# Patient Record
Sex: FEMALE | Race: WHITE | NOT HISPANIC OR LATINO | Employment: UNEMPLOYED | URBAN - METROPOLITAN AREA
[De-identification: names, ages, dates, MRNs, and addresses within clinical notes are randomized per-mention and may not be internally consistent; named-entity substitution may affect disease eponyms.]

---

## 2018-10-10 ENCOUNTER — PROCEDURE VISIT (OUTPATIENT)
Dept: OBSTETRICS AND GYNECOLOGY | Facility: CLINIC | Age: 23
End: 2018-10-10

## 2018-10-10 ENCOUNTER — OFFICE VISIT (OUTPATIENT)
Dept: OBSTETRICS AND GYNECOLOGY | Facility: CLINIC | Age: 23
End: 2018-10-10

## 2018-10-10 VITALS
HEIGHT: 63 IN | WEIGHT: 143 LBS | BODY MASS INDEX: 25.34 KG/M2 | SYSTOLIC BLOOD PRESSURE: 116 MMHG | DIASTOLIC BLOOD PRESSURE: 64 MMHG

## 2018-10-10 DIAGNOSIS — O03.9 COMPLETE ABORTION: Primary | ICD-10-CM

## 2018-10-10 DIAGNOSIS — O20.0 THREATENED MISCARRIAGE: Primary | ICD-10-CM

## 2018-10-10 LAB
BILIRUB BLD-MCNC: NEGATIVE MG/DL
CLARITY, POC: CLEAR
COLOR UR: YELLOW
GLUCOSE UR STRIP-MCNC: NEGATIVE MG/DL
HCG INTACT+B SERPL-ACNC: 16.34 MIU/ML
KETONES UR QL: NEGATIVE
LEUKOCYTE EST, POC: NEGATIVE
NITRITE UR-MCNC: NEGATIVE MG/ML
PH UR: 7 [PH] (ref 5–8)
PROT UR STRIP-MCNC: NEGATIVE MG/DL
RBC # UR STRIP: ABNORMAL /UL
SP GR UR: 1.02 (ref 1–1.03)
UROBILINOGEN UR QL: NORMAL

## 2018-10-10 PROCEDURE — 76817 TRANSVAGINAL US OBSTETRIC: CPT | Performed by: OBSTETRICS & GYNECOLOGY

## 2018-10-10 PROCEDURE — 99204 OFFICE O/P NEW MOD 45 MIN: CPT | Performed by: OBSTETRICS & GYNECOLOGY

## 2018-10-10 RX ORDER — PNV NO.95/FERROUS FUM/FOLIC AC 28MG-0.8MG
TABLET ORAL DAILY
Refills: 0 | COMMUNITY
Start: 2018-10-02 | End: 2021-08-09

## 2018-10-10 NOTE — PROGRESS NOTES
GERARDO Montoya  is a 23 y.o. female who presents for follow-up of a miscarriage.  She reports that her last menstrual period was 2 months ago.  She took a home pregnancy test last week and it was positive.  The next day, she began to experience significant bleeding with the passage of clots and cramps.  This lasted approximately 10 hours.  The patient presented to [facility] emergency room where a workup was performed by the emergency room physician.  I have reviewed this and discussed it with the patient.  It shows a normal hemoglobin.  Blood type is A+.  Quantitative hCG was 77.  Since that time, the bleeding has gradually  Resolved.  The patient does not have any more abdominal or pelvic pain.  No more nausea or breast tenderness.  She presents today for further evaluation.    Chief Complaint   Patient presents with   • Initial Prenatal Visit     Patient is here for a new ob but miscarried.       History reviewed. No pertinent past medical history.    History reviewed. No pertinent surgical history.    Social History     Social History   • Marital status: Single     Spouse name: N/A   • Number of children: N/A   • Years of education: N/A     Occupational History   • Not on file.     Social History Main Topics   • Smoking status: Current Every Day Smoker   • Smokeless tobacco: Never Used   • Alcohol use No   • Drug use: No   • Sexual activity: Yes     Partners: Male     Other Topics Concern   • Not on file     Social History Narrative   • No narrative on file       The following portions of the patient's history were reviewed and updated as appropriate: allergies, current medications, past family history, past medical history, past social history, past surgical history and problem list.    Review of Systems is positive for vaginal bleeding which has now resolved.  It is negative for nausea or breast tenderness.  It is negative for abdominal pain.  It is negative for fever or chills.  All other systems  are reviewed and are negative.          Physical Exam   Constitutional: She is oriented to person, place, and time. She appears well-developed and well-nourished.   Abdominal: Soft. She exhibits no distension and no mass. There is no tenderness. There is no guarding.   Genitourinary:   Genitourinary Comments: Normal female external genitalia  The vagina is estrogenized.  There is no blood in the vaginal vault  Cervix is closed  The uterus is normal size.  It is mobile within the pelvis and it is nontender  No adnexal masses or tenderness are palpable   Neurological: She is alert and oriented to person, place, and time.   Skin: Skin is warm and dry.   Nursing note and vitals reviewed.      Assessment    Paris was seen today for initial prenatal visit.    Diagnoses and all orders for this visit:    Complete   -     HCG, B-subunit, Quantitative        Plan  1. Most likely a completed miscarriage.  Counseled regarding circumstances surrounding this.  Questions answered.  Also, we reviewed the hospital visit from [facility].  30 minutes of a 45 minute visit were spent in direct face-to-face counseling on this issue.      2.  quantitative hCG was 77.  We will repeat this today.  Ultrasound today shows a smooth endometrial stripe with no adnexal masses.  We discussed the significance of this.  We will follow quantitative hCGs down to 0.  After this, the patient is uncertain if she would like to become pregnant sooner if she would like to prevent this.  We will discuss this further at the workup proceeds.

## 2018-10-11 ENCOUNTER — TELEPHONE (OUTPATIENT)
Dept: OBSTETRICS AND GYNECOLOGY | Facility: CLINIC | Age: 23
End: 2018-10-11

## 2018-10-11 DIAGNOSIS — O03.9 MISCARRIAGE: Primary | ICD-10-CM

## 2018-10-11 NOTE — TELEPHONE ENCOUNTER
----- Message from Jared Anand MD sent at 10/11/2018 11:01 AM EDT -----  I could not reach the patient to review her test results.  Please contact her and let her know that her quantitative hCG has fallen from 77-16.34.  Please help her to arrange a lab visit for next week to repeat her quantitative hCG.  Thank you.

## 2018-10-12 LAB
BACTERIA UR CULT: NO GROWTH
BACTERIA UR CULT: NORMAL

## 2018-10-16 ENCOUNTER — RESULTS ENCOUNTER (OUTPATIENT)
Dept: OBSTETRICS AND GYNECOLOGY | Facility: CLINIC | Age: 23
End: 2018-10-16

## 2018-10-16 DIAGNOSIS — O03.9 MISCARRIAGE: ICD-10-CM

## 2019-07-26 ENCOUNTER — TELEPHONE (OUTPATIENT)
Dept: OBSTETRICS AND GYNECOLOGY | Age: 24
End: 2019-07-26

## 2019-07-26 NOTE — TELEPHONE ENCOUNTER
*Pt Aware Dr Hannon is out*     New Pt Dr Hannon LMP? referred by a friend to see Dr Hannon. Pt 2nd preg, hx of miscarriages. Pt delivered full term, no complications with 1st child. Dr Jared Brennan was the prior ObGyn doctor. Pt INS: [employer]. Please Advise

## 2019-08-07 ENCOUNTER — INITIAL PRENATAL (OUTPATIENT)
Dept: OBSTETRICS AND GYNECOLOGY | Age: 24
End: 2019-08-07

## 2019-08-07 ENCOUNTER — PROCEDURE VISIT (OUTPATIENT)
Dept: OBSTETRICS AND GYNECOLOGY | Age: 24
End: 2019-08-07

## 2019-08-07 VITALS — BODY MASS INDEX: 24.76 KG/M2 | SYSTOLIC BLOOD PRESSURE: 100 MMHG | WEIGHT: 139.8 LBS | DIASTOLIC BLOOD PRESSURE: 62 MMHG

## 2019-08-07 DIAGNOSIS — O99.891 ASYMPTOMATIC BACTERIURIA IN PREGNANCY: ICD-10-CM

## 2019-08-07 DIAGNOSIS — Z13.29 SCREENING FOR THYROID DISORDER: Primary | ICD-10-CM

## 2019-08-07 DIAGNOSIS — O36.80X0 ENCOUNTER TO DETERMINE FETAL VIABILITY OF PREGNANCY, SINGLE OR UNSPECIFIED FETUS: Primary | ICD-10-CM

## 2019-08-07 DIAGNOSIS — R82.71 ASYMPTOMATIC BACTERIURIA IN PREGNANCY: ICD-10-CM

## 2019-08-07 DIAGNOSIS — Z36.87 UNSURE OF LAST MENSTRUAL PERIOD AS REASON FOR ULTRASOUND SCAN: ICD-10-CM

## 2019-08-07 DIAGNOSIS — O99.330 TOBACCO USE DURING PREGNANCY, ANTEPARTUM: ICD-10-CM

## 2019-08-07 DIAGNOSIS — Z11.3 SCREENING FOR STD (SEXUALLY TRANSMITTED DISEASE): ICD-10-CM

## 2019-08-07 LAB
B-HCG UR QL: POSITIVE
C TRACH RRNA SPEC DONR QL NAA+PROBE: NEGATIVE
INTERNAL NEGATIVE CONTROL: NEGATIVE
INTERNAL POSITIVE CONTROL: POSITIVE
Lab: ABNORMAL
N GONORRHOEA DNA SPEC QL NAA+PROBE: NEGATIVE
VZV IGG SER QL: NORMAL
VZV IGG SER QL: NORMAL

## 2019-08-07 PROCEDURE — 81025 URINE PREGNANCY TEST: CPT | Performed by: OBSTETRICS & GYNECOLOGY

## 2019-08-07 PROCEDURE — 76817 TRANSVAGINAL US OBSTETRIC: CPT | Performed by: OBSTETRICS & GYNECOLOGY

## 2019-08-07 PROCEDURE — 99214 OFFICE O/P EST MOD 30 MIN: CPT | Performed by: OBSTETRICS & GYNECOLOGY

## 2019-08-07 RX ORDER — ASCORBIC ACID, CALCIUM CITRATE, IRON, VITAMIN D, DL- ALPHA- TOCOPHEROL ACETATE, THIAMINE, RIBOFLAVIN, NIACINAMIDE, PYRIDOXINE HYDROCHLORIDE, FOLIC ACID, IODINE, ZINC, COPPER, DOCUSATE SODIUM, DOCONEXENT AND ICOSAPENT
1 KIT DAILY
Qty: 60 TABLET | Refills: 12 | Status: SHIPPED | OUTPATIENT
Start: 2019-08-07 | End: 2020-08-06

## 2019-08-07 NOTE — PROGRESS NOTES
CC: New OB visit  HPI: pt presents for new OB visit. She denies any pain or bleeding. She reports she found out she was pregnant when she went to [facility] for yeast infection. She has a pregnancy test done and it was positive. She has tested about a month before this and it was negative.  ROS:  Neuro: neg for headaches  Pulm: neg for soa  GI: neg for n/v  : neg for vag bleeding  CV: neg for swelling  O: see physical form    U/s: intrauterine gest sac measuring 6 w 1 d without yolk sac or fetal pole    A/p: Pt with unsure dates. Plan f/u u/s 2 weeks to assess further. Reviewed SAB warnings as missed AB possible and pt understands. Also reviewed possibility of early viable gestation although this is less likely.     Recommended smoking cessation asap. Reviewed risks to pregnancy including SAB, abruption, PTL and SIDS    Discussed pnguidelines and reviewed information with pt and partner. All questions answered. Recommended pnlabs today

## 2019-08-08 LAB
ABO GROUP BLD: (no result)
BASOPHILS # BLD AUTO: 0.1 X10E3/UL (ref 0–0.2)
BASOPHILS NFR BLD AUTO: 1 %
BLD GP AB SCN SERPL QL: NEGATIVE
EOSINOPHIL # BLD AUTO: 0.2 X10E3/UL (ref 0–0.4)
EOSINOPHIL NFR BLD AUTO: 1 %
ERYTHROCYTE [DISTWIDTH] IN BLOOD BY AUTOMATED COUNT: 13.6 % (ref 12.3–15.4)
HBV SURFACE AG SERPL QL IA: NEGATIVE
HCT VFR BLD AUTO: 45.3 % (ref 34–46.6)
HCV AB S/CO SERPL IA: <0.1 S/CO RATIO (ref 0–0.9)
HGB BLD-MCNC: 15.3 G/DL (ref 11.1–15.9)
HIV 1+2 AB+HIV1 P24 AG SERPL QL IA: NON REACTIVE
IMM GRANULOCYTES # BLD AUTO: 0 X10E3/UL (ref 0–0.1)
IMM GRANULOCYTES NFR BLD AUTO: 0 %
LYMPHOCYTES # BLD AUTO: 2.6 X10E3/UL (ref 0.7–3.1)
LYMPHOCYTES NFR BLD AUTO: 24 %
MCH RBC QN AUTO: 30.2 PG (ref 26.6–33)
MCHC RBC AUTO-ENTMCNC: 33.8 G/DL (ref 31.5–35.7)
MCV RBC AUTO: 90 FL (ref 79–97)
MONOCYTES # BLD AUTO: 0.6 X10E3/UL (ref 0.1–0.9)
MONOCYTES NFR BLD AUTO: 6 %
NEUTROPHILS # BLD AUTO: 7.4 X10E3/UL (ref 1.4–7)
NEUTROPHILS NFR BLD AUTO: 68 %
PLATELET # BLD AUTO: 486 X10E3/UL (ref 150–450)
RBC # BLD AUTO: 5.06 X10E6/UL (ref 3.77–5.28)
RH BLD: POSITIVE
RPR SER QL: NON REACTIVE
RUBV IGG SERPL IA-ACNC: 1.64 INDEX
TSH SERPL DL<=0.005 MIU/L-ACNC: 1.93 MIU/ML (ref 0.27–4.2)
WBC # BLD AUTO: 10.9 X10E3/UL (ref 3.4–10.8)

## 2019-08-09 LAB
BACTERIA UR CULT: NORMAL
BACTERIA UR CULT: NORMAL

## 2019-08-10 LAB
C TRACH RRNA CVX QL NAA+PROBE: NEGATIVE
CONV .: NORMAL
CYTOLOGIST CVX/VAG CYTO: NORMAL
CYTOLOGY CVX/VAG DOC CYTO: NORMAL
CYTOLOGY CVX/VAG DOC THIN PREP: NORMAL
DX ICD CODE: NORMAL
HIV 1 & 2 AB SER-IMP: NORMAL
N GONORRHOEA RRNA CVX QL NAA+PROBE: NEGATIVE
OTHER STN SPEC: NORMAL
STAT OF ADQ CVX/VAG CYTO-IMP: NORMAL

## 2019-08-12 ENCOUNTER — TELEPHONE (OUTPATIENT)
Dept: OBSTETRICS AND GYNECOLOGY | Age: 24
End: 2019-08-12

## 2019-08-12 NOTE — TELEPHONE ENCOUNTER
----- Message from Perla Hannon MD sent at 8/12/2019  8:01 AM EDT -----  Please call Paris, her pap and cultures are negative/normal

## 2019-08-28 ENCOUNTER — PROCEDURE VISIT (OUTPATIENT)
Dept: OBSTETRICS AND GYNECOLOGY | Age: 24
End: 2019-08-28

## 2019-08-28 ENCOUNTER — ROUTINE PRENATAL (OUTPATIENT)
Dept: OBSTETRICS AND GYNECOLOGY | Age: 24
End: 2019-08-28

## 2019-08-28 VITALS — DIASTOLIC BLOOD PRESSURE: 60 MMHG | BODY MASS INDEX: 25.4 KG/M2 | SYSTOLIC BLOOD PRESSURE: 100 MMHG | WEIGHT: 143.4 LBS

## 2019-08-28 DIAGNOSIS — O36.80X0 ENCOUNTER TO DETERMINE FETAL VIABILITY OF PREGNANCY, SINGLE OR UNSPECIFIED FETUS: Primary | ICD-10-CM

## 2019-08-28 DIAGNOSIS — O36.5990 POOR FETAL GROWTH AFFECTING MANAGEMENT OF MOTHER, ANTEPARTUM, SINGLE OR UNSPECIFIED FETUS: ICD-10-CM

## 2019-08-28 DIAGNOSIS — Z3A.09 9 WEEKS GESTATION OF PREGNANCY: Primary | ICD-10-CM

## 2019-08-28 PROCEDURE — 76817 TRANSVAGINAL US OBSTETRIC: CPT | Performed by: OBSTETRICS & GYNECOLOGY

## 2019-08-28 PROCEDURE — 99213 OFFICE O/P EST LOW 20 MIN: CPT | Performed by: OBSTETRICS & GYNECOLOGY

## 2019-08-28 NOTE — PROGRESS NOTES
CC: pt presents for routine f/u. She denies bleeding or pelvic pain  ROS:  Neuro: neg for headaches  GI: neg for n/v  : neg for bleeding/pelvic pain  O: gen : pt in no distress  Abd: soft, nontender  Ext: no edema or cords  tv u/s: viable IUP measuring 8 weeks,   A/p: development of fetal pole with cardiac activity noted. Size is less than dates so plan u/s 2 weeks and counseled pt    Pt desires aneuploidy screening at f/u. Declines CF testing    Recommended smoking cessation

## 2019-09-17 ENCOUNTER — PROCEDURE VISIT (OUTPATIENT)
Dept: OBSTETRICS AND GYNECOLOGY | Age: 24
End: 2019-09-17

## 2019-09-17 ENCOUNTER — ROUTINE PRENATAL (OUTPATIENT)
Dept: OBSTETRICS AND GYNECOLOGY | Age: 24
End: 2019-09-17

## 2019-09-17 VITALS — WEIGHT: 145 LBS | DIASTOLIC BLOOD PRESSURE: 60 MMHG | BODY MASS INDEX: 25.69 KG/M2 | SYSTOLIC BLOOD PRESSURE: 100 MMHG

## 2019-09-17 DIAGNOSIS — O36.80X0 ENCOUNTER TO DETERMINE FETAL VIABILITY OF PREGNANCY, SINGLE OR UNSPECIFIED FETUS: Primary | ICD-10-CM

## 2019-09-17 DIAGNOSIS — Z36.0 ENCOUNTER FOR ANTENATAL SCREENING FOR CHROMOSOMAL ANOMALIES: Primary | ICD-10-CM

## 2019-09-17 DIAGNOSIS — Z3A.11 11 WEEKS GESTATION OF PREGNANCY: ICD-10-CM

## 2019-09-17 LAB
EXTERNAL CYSTIC FIBROSIS: NORMAL
EXTERNAL NIPT: NORMAL

## 2019-09-17 PROCEDURE — 76817 TRANSVAGINAL US OBSTETRIC: CPT | Performed by: OBSTETRICS & GYNECOLOGY

## 2019-09-17 PROCEDURE — 99213 OFFICE O/P EST LOW 20 MIN: CPT | Performed by: OBSTETRICS & GYNECOLOGY

## 2019-09-17 NOTE — PROGRESS NOTES
CC: ob visit and u/s  HPI: pt denies any current issues except occ ligament pain  ROS:  Const: neg for fevers  GI: neg for n/v  : neg for bleeding or pain  O: pt in no distress  U/s fetal pole 11 weeks,   fht's 168,   ext: no cords or tenderness  A/p 11 weeks: pt desires aneuploidy testing, pt declines carrier testing. Plan f/u 4 weeks

## 2019-09-21 LAB
CFDNA.FET/CFDNA.TOTAL SFR FETUS: NORMAL %
CITATION REF LAB TEST: NORMAL
FET CHR 13 TS PLAS.CFDNA QL: NEGATIVE
FET CHR 13+18+21 TS PLAS.CFDNA QL: NORMAL
FET CHR 18 TS PLAS.CFDNA QL: NEGATIVE
FET CHR 21 TS PLAS.CFDNA QL: NEGATIVE
FET Y CHROM PLAS.CFDNA QL: DETECTED
FET Y CHROM PLAS.CFDNA: NORMAL
LAB DIRECTOR NAME PROVIDER: NORMAL
LABORATORY COMMENT REPORT: NORMAL
LIMITATIONS OF THE TEST: NORMAL
NOTE: NORMAL
POSITIVE PREDICTIVE VALUE: NORMAL
REF LAB TEST METHOD: NORMAL
SERVICE CMNT 02-IMP: NORMAL
SERVICE CMNT 03-IMP: NORMAL
SERVICE CMNT-IMP: NORMAL
TEST PERFORMANCE INFO SPEC: NORMAL
TEST PERFORMANCE INFO SPEC: NORMAL

## 2019-09-23 ENCOUNTER — TELEPHONE (OUTPATIENT)
Dept: OBSTETRICS AND GYNECOLOGY | Age: 24
End: 2019-09-23

## 2019-09-23 NOTE — TELEPHONE ENCOUNTER
Called pt to review aneuploidy results. She desires gender results and notified she was having a boy.

## 2019-11-05 ENCOUNTER — ROUTINE PRENATAL (OUTPATIENT)
Dept: OBSTETRICS AND GYNECOLOGY | Age: 24
End: 2019-11-05

## 2019-11-05 ENCOUNTER — PROCEDURE VISIT (OUTPATIENT)
Dept: OBSTETRICS AND GYNECOLOGY | Age: 24
End: 2019-11-05

## 2019-11-05 VITALS — BODY MASS INDEX: 24.84 KG/M2 | WEIGHT: 140.2 LBS | SYSTOLIC BLOOD PRESSURE: 100 MMHG | DIASTOLIC BLOOD PRESSURE: 60 MMHG

## 2019-11-05 DIAGNOSIS — Z03.75 SUSPECTED CERVICAL SHORTENING NOT FOUND: Primary | ICD-10-CM

## 2019-11-05 DIAGNOSIS — Z36.1 NEED FOR MATERNAL SERUM ALPHA-PROTEIN (MSAFP) SCREENING: ICD-10-CM

## 2019-11-05 DIAGNOSIS — F32.A DEPRESSION, UNSPECIFIED DEPRESSION TYPE: ICD-10-CM

## 2019-11-05 DIAGNOSIS — Z3A.18 18 WEEKS GESTATION OF PREGNANCY: Primary | ICD-10-CM

## 2019-11-05 PROCEDURE — 99213 OFFICE O/P EST LOW 20 MIN: CPT | Performed by: OBSTETRICS & GYNECOLOGY

## 2019-11-05 PROCEDURE — 76817 TRANSVAGINAL US OBSTETRIC: CPT | Performed by: OBSTETRICS & GYNECOLOGY

## 2019-11-05 RX ORDER — TIZANIDINE 4 MG/1
TABLET ORAL
Refills: 0 | COMMUNITY
Start: 2019-10-25 | End: 2019-11-05

## 2019-11-05 RX ORDER — MELOXICAM 7.5 MG/1
7.5 TABLET ORAL DAILY
COMMUNITY
End: 2019-11-05

## 2019-11-05 RX ORDER — DICLOFENAC SODIUM AND MISOPROSTOL 75; 200 MG/1; UG/1
1 TABLET, DELAYED RELEASE ORAL 2 TIMES DAILY
COMMUNITY
End: 2019-11-05
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
## 2019-11-07 LAB
AFP ADJ MOM SERPL: 0.99
AFP INTERP SERPL-IMP: NORMAL
AFP INTERP SERPL-IMP: NORMAL
AFP SERPL-MCNC: 47.5 NG/ML
AGE AT DELIVERY: 25.8 YR
GA METHOD: NORMAL
GA: 18.4 WEEKS
IDDM PATIENT QL: NO
LABORATORY COMMENT REPORT: NORMAL
MULTIPLE PREGNANCY: NO
NEURAL TUBE DEFECT RISK FETUS: NORMAL %
RESULT: NORMAL

## 2019-11-08 ENCOUNTER — TELEPHONE (OUTPATIENT)
Dept: OBSTETRICS AND GYNECOLOGY | Age: 24
End: 2019-11-08

## 2019-11-08 NOTE — TELEPHONE ENCOUNTER
----- Message from Perla Hannon MD sent at 11/7/2019  7:56 PM EST -----  Please call Paris, her AFP is normal

## 2019-11-19 ENCOUNTER — TELEPHONE (OUTPATIENT)
Dept: OBSTETRICS AND GYNECOLOGY | Age: 24
End: 2019-11-19

## 2019-11-19 NOTE — TELEPHONE ENCOUNTER
Called pt due to missed appt. Home phone not in service. Unable to complete call to mobile number given. Left message at second home number to r/s appt asap

## 2019-12-13 ENCOUNTER — PROCEDURE VISIT (OUTPATIENT)
Dept: OBSTETRICS AND GYNECOLOGY | Age: 24
End: 2019-12-13

## 2019-12-13 ENCOUNTER — ROUTINE PRENATAL (OUTPATIENT)
Dept: OBSTETRICS AND GYNECOLOGY | Age: 24
End: 2019-12-13

## 2019-12-13 VITALS — BODY MASS INDEX: 28.17 KG/M2 | WEIGHT: 159 LBS | SYSTOLIC BLOOD PRESSURE: 100 MMHG | DIASTOLIC BLOOD PRESSURE: 60 MMHG

## 2019-12-13 DIAGNOSIS — Z3A.23 23 WEEKS GESTATION OF PREGNANCY: Primary | ICD-10-CM

## 2019-12-13 DIAGNOSIS — Z36.89 ENCOUNTER FOR FETAL ANATOMIC SURVEY: Primary | ICD-10-CM

## 2019-12-13 DIAGNOSIS — Z36.86 ENCOUNTER FOR ANTENATAL SCREENING FOR CERVICAL LENGTH: ICD-10-CM

## 2019-12-13 LAB
CLARITY, POC: CLEAR
COLOR UR: YELLOW
GLUCOSE UR STRIP-MCNC: NEGATIVE MG/DL
PROT UR STRIP-MCNC: NEGATIVE MG/DL

## 2019-12-13 PROCEDURE — 76805 OB US >/= 14 WKS SNGL FETUS: CPT | Performed by: OBSTETRICS & GYNECOLOGY

## 2019-12-13 PROCEDURE — 76817 TRANSVAGINAL US OBSTETRIC: CPT | Performed by: OBSTETRICS & GYNECOLOGY

## 2019-12-13 PROCEDURE — 99213 OFFICE O/P EST LOW 20 MIN: CPT | Performed by: OBSTETRICS & GYNECOLOGY
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
## 2019-12-20 ENCOUNTER — ROUTINE PRENATAL (OUTPATIENT)
Dept: OBSTETRICS AND GYNECOLOGY | Age: 24
End: 2019-12-20

## 2019-12-20 VITALS — DIASTOLIC BLOOD PRESSURE: 60 MMHG | BODY MASS INDEX: 28.52 KG/M2 | WEIGHT: 161 LBS | SYSTOLIC BLOOD PRESSURE: 102 MMHG

## 2019-12-20 DIAGNOSIS — Z13.0 SCREENING FOR IRON DEFICIENCY ANEMIA: ICD-10-CM

## 2019-12-20 DIAGNOSIS — Z13.1 SCREENING FOR DIABETES MELLITUS: ICD-10-CM

## 2019-12-20 DIAGNOSIS — Z3A.24 24 WEEKS GESTATION OF PREGNANCY: Primary | ICD-10-CM

## 2019-12-20 LAB
BASOPHILS # BLD AUTO: 0.05 10*3/MM3 (ref 0–0.2)
BASOPHILS NFR BLD AUTO: 0.3 % (ref 0–1.5)
CLARITY, POC: CLEAR
COLOR UR: YELLOW
EOSINOPHIL # BLD AUTO: 0.23 10*3/MM3 (ref 0–0.4)
EOSINOPHIL NFR BLD AUTO: 1.6 % (ref 0.3–6.2)
ERYTHROCYTE [DISTWIDTH] IN BLOOD BY AUTOMATED COUNT: 11.9 % (ref 12.3–15.4)
GLUCOSE 1H P 50 G GLC PO SERPL-MCNC: 164 MG/DL (ref 65–179)
GLUCOSE UR STRIP-MCNC: NEGATIVE MG/DL
HCT VFR BLD AUTO: 35.9 % (ref 34–46.6)
HGB BLD-MCNC: 12.4 G/DL (ref 12–15.9)
IMM GRANULOCYTES # BLD AUTO: 0.15 10*3/MM3 (ref 0–0.05)
IMM GRANULOCYTES NFR BLD AUTO: 1 % (ref 0–0.5)
LYMPHOCYTES # BLD AUTO: 2.58 10*3/MM3 (ref 0.7–3.1)
LYMPHOCYTES NFR BLD AUTO: 18 % (ref 19.6–45.3)
MCH RBC QN AUTO: 30.3 PG (ref 26.6–33)
MCHC RBC AUTO-ENTMCNC: 34.5 G/DL (ref 31.5–35.7)
MCV RBC AUTO: 87.8 FL (ref 79–97)
MONOCYTES # BLD AUTO: 0.74 10*3/MM3 (ref 0.1–0.9)
MONOCYTES NFR BLD AUTO: 5.1 % (ref 5–12)
NEUTROPHILS # BLD AUTO: 10.62 10*3/MM3 (ref 1.7–7)
NEUTROPHILS NFR BLD AUTO: 74 % (ref 42.7–76)
NRBC BLD AUTO-RTO: 0 /100 WBC (ref 0–0.2)
PLATELET # BLD AUTO: 484 10*3/MM3 (ref 140–450)
PROT UR STRIP-MCNC: NEGATIVE MG/DL
RBC # BLD AUTO: 4.09 10*6/MM3 (ref 3.77–5.28)
WBC # BLD AUTO: 14.37 10*3/MM3 (ref 3.4–10.8)

## 2019-12-20 PROCEDURE — 99213 OFFICE O/P EST LOW 20 MIN: CPT | Performed by: OBSTETRICS & GYNECOLOGY

## 2019-12-20 NOTE — PROGRESS NOTES
CC: OB visit and glucose  HPI: pt presents for routine visit today. She notes fetal movement. She denies vaginal bleeding or leaking fluid. She denies regular ctx.  Ros:  Pulm: neg for soa  GI: neg for abd pain or n/v  : neg for vag bleeding, leaking fluid or reg ctx  O: fh= 25 cm, FHT's 161, ext: no cords or edema  A/p: 24 weeks: pt presents for one hour today.     Reviewed PTL warnings, rtc 3 weeks    Encouraged smoking cessation. Pt notes he has cut back significantly. Reviewed risks to include abruption, PTL, SIDs and pt notes understanding

## 2019-12-23 ENCOUNTER — TELEPHONE (OUTPATIENT)
Dept: OBSTETRICS AND GYNECOLOGY | Age: 24
End: 2019-12-23

## 2019-12-23 DIAGNOSIS — R73.09 ELEVATED GLUCOSE TOLERANCE TEST: Primary | ICD-10-CM

## 2019-12-23 PROBLEM — Z3A.24 24 WEEKS GESTATION OF PREGNANCY: Status: RESOLVED | Noted: 2019-12-20 | Resolved: 2019-12-23

## 2019-12-23 NOTE — TELEPHONE ENCOUNTER
Called pt to review glucose results. Left message of call and placed order for 3 hour gestational screen.

## 2019-12-23 NOTE — TELEPHONE ENCOUNTER
----- Message from Perla Hannon MD sent at 12/23/2019  7:46 AM EST -----  Called pt and no answer. Please notify her that glucose screen is elevated and she needs 3 hour asap. I have placed orders for 3 hour.

## 2019-12-27 LAB
GLUCOSE 1H P 100 G GLC PO SERPL-MCNC: 169 MG/DL (ref 65–179)
GLUCOSE 2H P 100 G GLC PO SERPL-MCNC: 136 MG/DL (ref 65–154)
GLUCOSE 3H P 100 G GLC PO SERPL-MCNC: 105 MG/DL (ref 65–139)
GLUCOSE P FAST SERPL-MCNC: 75 MG/DL (ref 65–94)

## 2019-12-30 ENCOUNTER — TELEPHONE (OUTPATIENT)
Dept: OBSTETRICS AND GYNECOLOGY | Age: 24
End: 2019-12-30

## 2019-12-30 NOTE — TELEPHONE ENCOUNTER
----- Message from Perla Hannon MD sent at 12/27/2019  7:41 PM EST -----  Call Paris, she passed her 3 hour

## 2020-01-10 ENCOUNTER — ROUTINE PRENATAL (OUTPATIENT)
Dept: OBSTETRICS AND GYNECOLOGY | Age: 25
End: 2020-01-10

## 2020-01-10 VITALS — WEIGHT: 164.2 LBS | SYSTOLIC BLOOD PRESSURE: 100 MMHG | BODY MASS INDEX: 29.09 KG/M2 | DIASTOLIC BLOOD PRESSURE: 60 MMHG

## 2020-01-10 DIAGNOSIS — Z3A.27 27 WEEKS GESTATION OF PREGNANCY: Primary | ICD-10-CM

## 2020-01-10 PROCEDURE — 99213 OFFICE O/P EST LOW 20 MIN: CPT | Performed by: OBSTETRICS & GYNECOLOGY

## 2020-01-10 NOTE — PROGRESS NOTES
CC: OB visit  HPI: pt presents for routine visit. She denies any issues today. She notes active fetal movement and denies reg ctx, vag bleeding, or leaking fluid. She notes she has not stopped smoking completely but has decreased significantly  ROS:  Const: neg for fever  GI: neg for n/v  : neg for ctx, vag bleeding or leaking fluid  O: FH= 28 cm, FHT's 138, ext: no cords, edema or tenderness  A/p: reviewed daily fmc's and PTL warnings; Pt declines tdap and flu. Pt counseled regarding avoiding exposures and call if exposed/ill. Advised smoking cessation    rtc 2 weeks or prn

## 2020-02-07 ENCOUNTER — ROUTINE PRENATAL (OUTPATIENT)
Dept: OBSTETRICS AND GYNECOLOGY | Age: 25
End: 2020-02-07

## 2020-02-07 ENCOUNTER — PROCEDURE VISIT (OUTPATIENT)
Dept: OBSTETRICS AND GYNECOLOGY | Age: 25
End: 2020-02-07

## 2020-02-07 VITALS — SYSTOLIC BLOOD PRESSURE: 102 MMHG | WEIGHT: 169.8 LBS | DIASTOLIC BLOOD PRESSURE: 66 MMHG | BODY MASS INDEX: 30.08 KG/M2

## 2020-02-07 DIAGNOSIS — O99.330 TOBACCO USE DURING PREGNANCY, ANTEPARTUM: ICD-10-CM

## 2020-02-07 DIAGNOSIS — Z3A.31 31 WEEKS GESTATION OF PREGNANCY: Primary | ICD-10-CM

## 2020-02-07 PROCEDURE — 76816 OB US FOLLOW-UP PER FETUS: CPT | Performed by: OBSTETRICS & GYNECOLOGY

## 2020-02-07 PROCEDURE — 99213 OFFICE O/P EST LOW 20 MIN: CPT | Performed by: OBSTETRICS & GYNECOLOGY

## 2020-03-06 ENCOUNTER — ROUTINE PRENATAL (OUTPATIENT)
Dept: OBSTETRICS AND GYNECOLOGY | Age: 25
End: 2020-03-06

## 2020-03-06 VITALS — WEIGHT: 175 LBS | DIASTOLIC BLOOD PRESSURE: 60 MMHG | BODY MASS INDEX: 31 KG/M2 | SYSTOLIC BLOOD PRESSURE: 100 MMHG

## 2020-03-06 DIAGNOSIS — Z3A.35 35 WEEKS GESTATION OF PREGNANCY: Primary | ICD-10-CM

## 2020-03-06 DIAGNOSIS — Z36.85 ANTENATAL SCREENING FOR STREPTOCOCCUS B: ICD-10-CM

## 2020-03-06 LAB
CLARITY, POC: CLEAR
COLOR UR: YELLOW
GLUCOSE UR STRIP-MCNC: NEGATIVE MG/DL
PROT UR STRIP-MCNC: ABNORMAL MG/DL

## 2020-03-06 PROCEDURE — 99213 OFFICE O/P EST LOW 20 MIN: CPT | Performed by: OBSTETRICS & GYNECOLOGY

## 2020-03-06 NOTE — PROGRESS NOTES
Cc: OB visit  HPI: pt presents for routine visit. She denies any regular ctx, vag bleeding or leaking fluid. She has some increased pressure. She reports active fetal movement.    ROS:  Const: neg for fevers  Pulm: neg for cough/congestion  : pos for pressure in vagina, neg for reg ctx, vag bleeding  O: fh=35 cm, FHT's 166, ext: no cords or tenderness  Cervix: cl/ 50/-3  Ext: no edema or cords  A/p: 35 weeks: GBS cx done with sensitivity, advised daily fmc's, reviewed labor warnings    Recommended smoking cessation    rtc 1 week

## 2020-03-08 LAB — GP B STREP DNA SPEC QL NAA+PROBE: NEGATIVE

## 2020-03-10 ENCOUNTER — TELEPHONE (OUTPATIENT)
Dept: OBSTETRICS AND GYNECOLOGY | Age: 25
End: 2020-03-10

## 2020-03-10 NOTE — TELEPHONE ENCOUNTER
----- Message from Perla Hannon MD sent at 3/9/2020  7:46 AM EDT -----  Please call Paris, her GBS is negative

## 2020-03-13 ENCOUNTER — PROCEDURE VISIT (OUTPATIENT)
Dept: OBSTETRICS AND GYNECOLOGY | Age: 25
End: 2020-03-13

## 2020-03-13 ENCOUNTER — ROUTINE PRENATAL (OUTPATIENT)
Dept: OBSTETRICS AND GYNECOLOGY | Age: 25
End: 2020-03-13

## 2020-03-13 VITALS — BODY MASS INDEX: 31.6 KG/M2 | WEIGHT: 178.4 LBS | SYSTOLIC BLOOD PRESSURE: 110 MMHG | DIASTOLIC BLOOD PRESSURE: 70 MMHG

## 2020-03-13 DIAGNOSIS — Z13.0 SCREENING FOR IRON DEFICIENCY ANEMIA: ICD-10-CM

## 2020-03-13 DIAGNOSIS — Z3A.36 36 WEEKS GESTATION OF PREGNANCY: Primary | ICD-10-CM

## 2020-03-13 DIAGNOSIS — O99.330 TOBACCO USE DURING PREGNANCY, ANTEPARTUM: Primary | ICD-10-CM

## 2020-03-13 PROCEDURE — 76819 FETAL BIOPHYS PROFIL W/O NST: CPT | Performed by: OBSTETRICS & GYNECOLOGY

## 2020-03-13 PROCEDURE — 99213 OFFICE O/P EST LOW 20 MIN: CPT | Performed by: OBSTETRICS & GYNECOLOGY

## 2020-03-13 PROCEDURE — 76816 OB US FOLLOW-UP PER FETUS: CPT | Performed by: OBSTETRICS & GYNECOLOGY

## 2020-03-13 NOTE — PROGRESS NOTES
Cc: OB visit  HPI: pt presents for routine visit. She denies leakage of fluid or bleeding. She notes pelvic pressure and reports active fetal movement. She notes she has been craving ice recently  ROS:  Const: neg for fever/chills  Pulm: neg for soa  : neg for bleeding/leaking fluid or ctx  O: FH= 36, FHT's 129, ext: no edema or cords, nontender  Cervix 1/60/-2  A/p: reviewed labor warnings and advised daily c's    Tobacco use: check growth u/s today with BPP, if normal, plan BPP Q week through delivery; encouraged smoking cessation    Ordered cbc and ferritin due to symptom of ice craving

## 2020-03-14 LAB
BASOPHILS # BLD AUTO: 0.07 10*3/MM3 (ref 0–0.2)
BASOPHILS NFR BLD AUTO: 0.6 % (ref 0–1.5)
EOSINOPHIL # BLD AUTO: 0.11 10*3/MM3 (ref 0–0.4)
EOSINOPHIL NFR BLD AUTO: 0.9 % (ref 0.3–6.2)
ERYTHROCYTE [DISTWIDTH] IN BLOOD BY AUTOMATED COUNT: 13.4 % (ref 12.3–15.4)
FERRITIN SERPL-MCNC: 10.7 NG/ML (ref 13–150)
HCT VFR BLD AUTO: 35.2 % (ref 34–46.6)
HGB BLD-MCNC: 12 G/DL (ref 12–15.9)
IMM GRANULOCYTES # BLD AUTO: 0.07 10*3/MM3 (ref 0–0.05)
IMM GRANULOCYTES NFR BLD AUTO: 0.6 % (ref 0–0.5)
LYMPHOCYTES # BLD AUTO: 2.55 10*3/MM3 (ref 0.7–3.1)
LYMPHOCYTES NFR BLD AUTO: 21.1 % (ref 19.6–45.3)
MCH RBC QN AUTO: 29.3 PG (ref 26.6–33)
MCHC RBC AUTO-ENTMCNC: 34.1 G/DL (ref 31.5–35.7)
MCV RBC AUTO: 85.9 FL (ref 79–97)
MONOCYTES # BLD AUTO: 1.02 10*3/MM3 (ref 0.1–0.9)
MONOCYTES NFR BLD AUTO: 8.4 % (ref 5–12)
NEUTROPHILS # BLD AUTO: 8.26 10*3/MM3 (ref 1.7–7)
NEUTROPHILS NFR BLD AUTO: 68.4 % (ref 42.7–76)
NRBC BLD AUTO-RTO: 0 /100 WBC (ref 0–0.2)
PLATELET # BLD AUTO: 393 10*3/MM3 (ref 140–450)
RBC # BLD AUTO: 4.1 10*6/MM3 (ref 3.77–5.28)
WBC # BLD AUTO: 12.08 10*3/MM3 (ref 3.4–10.8)

## 2020-03-16 ENCOUNTER — TELEPHONE (OUTPATIENT)
Dept: OBSTETRICS AND GYNECOLOGY | Age: 25
End: 2020-03-16

## 2020-03-16 RX ORDER — FERROUS SULFATE 325(65) MG
325 TABLET ORAL
Qty: 30 TABLET | Refills: 1 | Status: SHIPPED | OUTPATIENT
Start: 2020-03-16 | End: 2021-08-09

## 2020-03-16 NOTE — TELEPHONE ENCOUNTER
----- Message from Perla Hannon MD sent at 3/15/2020  5:24 AM EDT -----  Call Paris, her hgb is normal but her ferritin is low. Advise her to start an iron supplement daily, iron sulfate 325 mg daily

## 2020-03-20 ENCOUNTER — ROUTINE PRENATAL (OUTPATIENT)
Dept: OBSTETRICS AND GYNECOLOGY | Age: 25
End: 2020-03-20

## 2020-03-20 ENCOUNTER — PROCEDURE VISIT (OUTPATIENT)
Dept: OBSTETRICS AND GYNECOLOGY | Age: 25
End: 2020-03-20

## 2020-03-20 VITALS — WEIGHT: 181.4 LBS | DIASTOLIC BLOOD PRESSURE: 76 MMHG | SYSTOLIC BLOOD PRESSURE: 110 MMHG | BODY MASS INDEX: 32.13 KG/M2

## 2020-03-20 DIAGNOSIS — O99.330 TOBACCO USE DURING PREGNANCY, ANTEPARTUM: ICD-10-CM

## 2020-03-20 DIAGNOSIS — O26.20 HISTORY OF RECURRENT ABORTION, CURRENTLY PREGNANT: Primary | ICD-10-CM

## 2020-03-20 DIAGNOSIS — R73.09 ELEVATED GLUCOSE TOLERANCE TEST: ICD-10-CM

## 2020-03-20 DIAGNOSIS — Z3A.37 37 WEEKS GESTATION OF PREGNANCY: Primary | ICD-10-CM

## 2020-03-20 PROCEDURE — 99213 OFFICE O/P EST LOW 20 MIN: CPT | Performed by: OBSTETRICS & GYNECOLOGY

## 2020-03-20 PROCEDURE — 76819 FETAL BIOPHYS PROFIL W/O NST: CPT | Performed by: OBSTETRICS & GYNECOLOGY

## 2020-03-20 NOTE — PROGRESS NOTES
CC: ob visit abd BPP  HPI: pt presents for routine OB visit. She notes active fetal movement. She denies regular ctx,vaginal bleeding or leaking fluid.   ROS:  Const: neg for fever  Pulm: neg for cough  : neg for vag bleeding, leaking fluid or regular ctx  O: fh= 37 cm, FHT's 125 with doppler, ext: no cords or tenderness  Cervix 2/60/-2  BPP 8/8, fam 18  A/p: 37 weeks: reviewed labor warnings and daily fmc's    Tobacco exposure: recommend smoking cessation. Continue daily fm'c and weekly BPP. Offered IOL at 39 weeks and discussed risks and benefits. Pt desires to proceed. Aware possible increase risk c/s and fetal intolerance of labor with induction. Plan 3/31/20.

## 2020-03-27 ENCOUNTER — ROUTINE PRENATAL (OUTPATIENT)
Dept: OBSTETRICS AND GYNECOLOGY | Age: 25
End: 2020-03-27

## 2020-03-27 ENCOUNTER — PREP FOR SURGERY (OUTPATIENT)
Dept: OTHER | Facility: HOSPITAL | Age: 25
End: 2020-03-27

## 2020-03-27 ENCOUNTER — PROCEDURE VISIT (OUTPATIENT)
Dept: OBSTETRICS AND GYNECOLOGY | Age: 25
End: 2020-03-27

## 2020-03-27 VITALS — SYSTOLIC BLOOD PRESSURE: 102 MMHG | BODY MASS INDEX: 32.91 KG/M2 | WEIGHT: 185.8 LBS | DIASTOLIC BLOOD PRESSURE: 62 MMHG

## 2020-03-27 DIAGNOSIS — O99.330 TOBACCO USE DURING PREGNANCY, ANTEPARTUM: Primary | ICD-10-CM

## 2020-03-27 DIAGNOSIS — O99.330 TOBACCO USE DURING PREGNANCY, ANTEPARTUM: ICD-10-CM

## 2020-03-27 DIAGNOSIS — Z34.90 TERM PREGNANCY: Primary | ICD-10-CM

## 2020-03-27 DIAGNOSIS — Z3A.38 38 WEEKS GESTATION OF PREGNANCY: Primary | ICD-10-CM

## 2020-03-27 PROCEDURE — 76819 FETAL BIOPHYS PROFIL W/O NST: CPT | Performed by: OBSTETRICS & GYNECOLOGY

## 2020-03-27 PROCEDURE — 99213 OFFICE O/P EST LOW 20 MIN: CPT | Performed by: OBSTETRICS & GYNECOLOGY

## 2020-03-27 RX ORDER — METHYLERGONOVINE MALEATE 0.2 MG/ML
200 INJECTION INTRAVENOUS ONCE AS NEEDED
Status: CANCELLED | OUTPATIENT
Start: 2020-03-27

## 2020-03-27 RX ORDER — OXYTOCIN-SODIUM CHLORIDE 0.9% IV SOLN 30 UNIT/500ML 30-0.9/5 UT/ML-%
125 SOLUTION INTRAVENOUS CONTINUOUS PRN
Status: CANCELLED | OUTPATIENT
Start: 2020-03-27

## 2020-03-27 RX ORDER — MISOPROSTOL 200 UG/1
800 TABLET ORAL AS NEEDED
Status: CANCELLED | OUTPATIENT
Start: 2020-03-27

## 2020-03-27 RX ORDER — SODIUM CHLORIDE 0.9 % (FLUSH) 0.9 %
10 SYRINGE (ML) INJECTION AS NEEDED
Status: CANCELLED | OUTPATIENT
Start: 2020-03-27

## 2020-03-27 RX ORDER — OXYTOCIN-SODIUM CHLORIDE 0.9% IV SOLN 30 UNIT/500ML 30-0.9/5 UT/ML-%
999 SOLUTION INTRAVENOUS ONCE
Status: CANCELLED | OUTPATIENT
Start: 2020-03-27 | End: 2020-03-27

## 2020-03-27 RX ORDER — SODIUM CHLORIDE 0.9 % (FLUSH) 0.9 %
3 SYRINGE (ML) INJECTION EVERY 12 HOURS SCHEDULED
Status: CANCELLED | OUTPATIENT
Start: 2020-03-27

## 2020-03-27 RX ORDER — OXYTOCIN-SODIUM CHLORIDE 0.9% IV SOLN 30 UNIT/500ML 30-0.9/5 UT/ML-%
250 SOLUTION INTRAVENOUS CONTINUOUS
Status: CANCELLED | OUTPATIENT
Start: 2020-03-27 | End: 2020-03-27

## 2020-03-27 RX ORDER — CARBOPROST TROMETHAMINE 250 UG/ML
250 INJECTION, SOLUTION INTRAMUSCULAR AS NEEDED
Status: CANCELLED | OUTPATIENT
Start: 2020-03-27

## 2020-03-27 RX ORDER — OXYTOCIN-SODIUM CHLORIDE 0.9% IV SOLN 30 UNIT/500ML 30-0.9/5 UT/ML-%
2-30 SOLUTION INTRAVENOUS
Status: CANCELLED | OUTPATIENT
Start: 2020-03-27

## 2020-03-27 NOTE — PROGRESS NOTES
CC: OB visit and BPP  HPI: pt presents for her OB visit. She denies any bleeding/leaking fluid. She notes active fetal movement. She notes occasional ctx but not regular  ROS:  Const: neg for fever  GI: pos for 1 episode of emesis, negative for nausea/diarrhea  : neg for vag bleeding or leaking fluid  O: FH=38 cm  FHT's 131  Cervix 2/60/-2  Ext: no cords or tenderness, no edema  BPP: 8/8, fam normal at 11    A/p:38 weeks: pt desires to proceed with planned induction next week. Reviewed instructions with pt. Continue daily fmc's and labor warnings prior to scheduled delivery.

## 2020-03-31 ENCOUNTER — ANESTHESIA EVENT (OUTPATIENT)
Dept: LABOR AND DELIVERY | Facility: HOSPITAL | Age: 25
End: 2020-03-31

## 2020-03-31 ENCOUNTER — HOSPITAL ENCOUNTER (INPATIENT)
Facility: HOSPITAL | Age: 25
LOS: 1 days | Discharge: HOME | End: 2020-04-01
Attending: OBSTETRICS & GYNECOLOGY | Admitting: OBSTETRICS & GYNECOLOGY

## 2020-03-31 ENCOUNTER — ANESTHESIA (OUTPATIENT)
Dept: LABOR AND DELIVERY | Facility: HOSPITAL | Age: 25
End: 2020-03-31

## 2020-03-31 ENCOUNTER — HOSPITAL ENCOUNTER (OUTPATIENT)
Dept: LABOR AND DELIVERY | Facility: HOSPITAL | Age: 25
Discharge: HOME | End: 2020-03-31

## 2020-03-31 DIAGNOSIS — O99.340 DEPRESSION DURING PREGNANCY, ANTEPARTUM: ICD-10-CM

## 2020-03-31 DIAGNOSIS — Z34.90 TERM PREGNANCY: ICD-10-CM

## 2020-03-31 DIAGNOSIS — O99.330 TOBACCO USE DURING PREGNANCY, ANTEPARTUM: Primary | ICD-10-CM

## 2020-03-31 DIAGNOSIS — F32.A DEPRESSION DURING PREGNANCY, ANTEPARTUM: ICD-10-CM

## 2020-03-31 LAB
ABO GROUP BLD: NORMAL
BLD GP AB SCN SERPL QL: NEGATIVE
DEPRECATED RDW RBC AUTO: 42.8 FL (ref 37–54)
ERYTHROCYTE [DISTWIDTH] IN BLOOD BY AUTOMATED COUNT: 13.9 % (ref 12.3–15.4)
EXPIRATION DATE: NORMAL
HCT VFR BLD AUTO: 35 % (ref 34–46.6)
HGB BLD-MCNC: 12 G/DL (ref 12–15.9)
Lab: NORMAL
MCH RBC QN AUTO: 28.9 PG (ref 26.6–33)
MCHC RBC AUTO-ENTMCNC: 34.3 G/DL (ref 31.5–35.7)
MCV RBC AUTO: 84.3 FL (ref 79–97)
PLATELET # BLD AUTO: 382 10*3/MM3 (ref 140–450)
PMV BLD AUTO: 10.9 FL (ref 6–12)
PROT UR STRIP-MCNC: NEGATIVE MG/DL
RBC # BLD AUTO: 4.15 10*6/MM3 (ref 3.77–5.28)
RH BLD: POSITIVE
T&S EXPIRATION DATE: NORMAL
WBC NRBC COR # BLD: 12.83 10*3/MM3 (ref 3.4–10.8)

## 2020-03-31 PROCEDURE — 25010000002 ONDANSETRON PER 1 MG

## 2020-03-31 PROCEDURE — 85027 COMPLETE CBC AUTOMATED: CPT | Performed by: OBSTETRICS & GYNECOLOGY

## 2020-03-31 PROCEDURE — 10907ZC DRAINAGE OF AMNIOTIC FLUID, THERAPEUTIC FROM PRODUCTS OF CONCEPTION, VIA NATURAL OR ARTIFICIAL OPENING: ICD-10-PCS | Performed by: OBSTETRICS & GYNECOLOGY

## 2020-03-31 PROCEDURE — 86901 BLOOD TYPING SEROLOGIC RH(D): CPT | Performed by: OBSTETRICS & GYNECOLOGY

## 2020-03-31 PROCEDURE — C1755 CATHETER, INTRASPINAL: HCPCS | Performed by: ANESTHESIOLOGY

## 2020-03-31 PROCEDURE — 59410 OBSTETRICAL CARE: CPT | Performed by: OBSTETRICS & GYNECOLOGY

## 2020-03-31 PROCEDURE — 0HQ9XZZ REPAIR PERINEUM SKIN, EXTERNAL APPROACH: ICD-10-PCS | Performed by: OBSTETRICS & GYNECOLOGY

## 2020-03-31 PROCEDURE — 86900 BLOOD TYPING SEROLOGIC ABO: CPT | Performed by: OBSTETRICS & GYNECOLOGY

## 2020-03-31 PROCEDURE — 0UQMXZZ REPAIR VULVA, EXTERNAL APPROACH: ICD-10-PCS | Performed by: OBSTETRICS & GYNECOLOGY

## 2020-03-31 PROCEDURE — 86850 RBC ANTIBODY SCREEN: CPT | Performed by: OBSTETRICS & GYNECOLOGY

## 2020-03-31 PROCEDURE — C1755 CATHETER, INTRASPINAL: HCPCS

## 2020-03-31 PROCEDURE — 3E033VJ INTRODUCTION OF OTHER HORMONE INTO PERIPHERAL VEIN, PERCUTANEOUS APPROACH: ICD-10-PCS | Performed by: OBSTETRICS & GYNECOLOGY

## 2020-03-31 PROCEDURE — 81002 URINALYSIS NONAUTO W/O SCOPE: CPT | Performed by: OBSTETRICS & GYNECOLOGY

## 2020-03-31 RX ORDER — ONDANSETRON 2 MG/ML
4 INJECTION INTRAMUSCULAR; INTRAVENOUS EVERY 6 HOURS PRN
Status: DISCONTINUED | OUTPATIENT
Start: 2020-03-31 | End: 2020-04-01 | Stop reason: HOSPADM

## 2020-03-31 RX ORDER — SODIUM CHLORIDE 0.9 % (FLUSH) 0.9 %
10 SYRINGE (ML) INJECTION AS NEEDED
Status: DISCONTINUED | OUTPATIENT
Start: 2020-03-31 | End: 2020-04-01 | Stop reason: HOSPADM

## 2020-03-31 RX ORDER — IBUPROFEN 600 MG/1
600 TABLET ORAL EVERY 8 HOURS PRN
Status: DISCONTINUED | OUTPATIENT
Start: 2020-03-31 | End: 2020-04-01 | Stop reason: HOSPADM

## 2020-03-31 RX ORDER — LANOLIN
CREAM (ML) TOPICAL
Status: DISCONTINUED | OUTPATIENT
Start: 2020-03-31 | End: 2020-04-01 | Stop reason: HOSPADM

## 2020-03-31 RX ORDER — OXYTOCIN-SODIUM CHLORIDE 0.9% IV SOLN 30 UNIT/500ML 30-0.9/5 UT/ML-%
125 SOLUTION INTRAVENOUS CONTINUOUS PRN
Status: COMPLETED | OUTPATIENT
Start: 2020-03-31 | End: 2020-03-31

## 2020-03-31 RX ORDER — ERYTHROMYCIN 5 MG/G
OINTMENT OPHTHALMIC
Status: DISPENSED
Start: 2020-03-31 | End: 2020-04-01

## 2020-03-31 RX ORDER — SODIUM CHLORIDE 0.9 % (FLUSH) 0.9 %
1-10 SYRINGE (ML) INJECTION AS NEEDED
Status: DISCONTINUED | OUTPATIENT
Start: 2020-03-31 | End: 2020-04-01 | Stop reason: HOSPADM

## 2020-03-31 RX ORDER — SODIUM CHLORIDE 0.9 % (FLUSH) 0.9 %
3 SYRINGE (ML) INJECTION EVERY 12 HOURS SCHEDULED
Status: DISCONTINUED | OUTPATIENT
Start: 2020-03-31 | End: 2020-04-01 | Stop reason: HOSPADM

## 2020-03-31 RX ORDER — NICOTINE 21 MG/24HR
1 PATCH, TRANSDERMAL 24 HOURS TRANSDERMAL
Status: DISCONTINUED | OUTPATIENT
Start: 2020-03-31 | End: 2020-04-01 | Stop reason: HOSPADM

## 2020-03-31 RX ORDER — MISOPROSTOL 200 UG/1
800 TABLET ORAL AS NEEDED
Status: DISCONTINUED | OUTPATIENT
Start: 2020-03-31 | End: 2020-03-31 | Stop reason: HOSPADM

## 2020-03-31 RX ORDER — LIDOCAINE HYDROCHLORIDE 10 MG/ML
INJECTION, SOLUTION EPIDURAL; INFILTRATION; INTRACAUDAL; PERINEURAL AS NEEDED
Status: DISCONTINUED | OUTPATIENT
Start: 2020-03-31 | End: 2020-03-31 | Stop reason: SURG

## 2020-03-31 RX ORDER — NICOTINE 21 MG/24HR
1 PATCH, TRANSDERMAL 24 HOURS TRANSDERMAL
Status: DISCONTINUED | OUTPATIENT
Start: 2020-03-31 | End: 2020-03-31

## 2020-03-31 RX ORDER — OXYTOCIN-SODIUM CHLORIDE 0.9% IV SOLN 30 UNIT/500ML 30-0.9/5 UT/ML-%
250 SOLUTION INTRAVENOUS CONTINUOUS
Status: DISPENSED | OUTPATIENT
Start: 2020-03-31 | End: 2020-03-31

## 2020-03-31 RX ORDER — LIDOCAINE HYDROCHLORIDE AND EPINEPHRINE 15; 5 MG/ML; UG/ML
INJECTION, SOLUTION EPIDURAL AS NEEDED
Status: DISCONTINUED | OUTPATIENT
Start: 2020-03-31 | End: 2020-03-31 | Stop reason: SURG

## 2020-03-31 RX ORDER — ONDANSETRON 2 MG/ML
INJECTION INTRAMUSCULAR; INTRAVENOUS
Status: COMPLETED
Start: 2020-03-31 | End: 2020-03-31

## 2020-03-31 RX ORDER — OXYCODONE HYDROCHLORIDE AND ACETAMINOPHEN 5; 325 MG/1; MG/1
2 TABLET ORAL EVERY 4 HOURS PRN
Status: DISCONTINUED | OUTPATIENT
Start: 2020-03-31 | End: 2020-04-01 | Stop reason: HOSPADM

## 2020-03-31 RX ORDER — MISOPROSTOL 200 UG/1
600 TABLET ORAL ONCE AS NEEDED
Status: DISCONTINUED | OUTPATIENT
Start: 2020-03-31 | End: 2020-04-01 | Stop reason: HOSPADM

## 2020-03-31 RX ORDER — OXYTOCIN-SODIUM CHLORIDE 0.9% IV SOLN 30 UNIT/500ML 30-0.9/5 UT/ML-%
999 SOLUTION INTRAVENOUS ONCE
Status: COMPLETED | OUTPATIENT
Start: 2020-03-31 | End: 2020-03-31

## 2020-03-31 RX ORDER — METHYLERGONOVINE MALEATE 0.2 MG/ML
200 INJECTION INTRAVENOUS ONCE AS NEEDED
Status: DISCONTINUED | OUTPATIENT
Start: 2020-03-31 | End: 2020-04-01 | Stop reason: HOSPADM

## 2020-03-31 RX ORDER — OXYCODONE HYDROCHLORIDE AND ACETAMINOPHEN 5; 325 MG/1; MG/1
1 TABLET ORAL EVERY 4 HOURS PRN
Status: DISCONTINUED | OUTPATIENT
Start: 2020-03-31 | End: 2020-04-01 | Stop reason: HOSPADM

## 2020-03-31 RX ORDER — CARBOPROST TROMETHAMINE 250 UG/ML
250 INJECTION, SOLUTION INTRAMUSCULAR AS NEEDED
Status: DISCONTINUED | OUTPATIENT
Start: 2020-03-31 | End: 2020-03-31 | Stop reason: HOSPADM

## 2020-03-31 RX ORDER — OXYTOCIN-SODIUM CHLORIDE 0.9% IV SOLN 30 UNIT/500ML 30-0.9/5 UT/ML-%
2-30 SOLUTION INTRAVENOUS
Status: DISCONTINUED | OUTPATIENT
Start: 2020-03-31 | End: 2020-04-01 | Stop reason: HOSPADM

## 2020-03-31 RX ORDER — EPHEDRINE SULFATE 50 MG/ML
10 INJECTION, SOLUTION INTRAVENOUS AS NEEDED
Status: DISCONTINUED | OUTPATIENT
Start: 2020-03-31 | End: 2020-04-01 | Stop reason: HOSPADM

## 2020-03-31 RX ORDER — SODIUM CHLORIDE, SODIUM LACTATE, POTASSIUM CHLORIDE, CALCIUM CHLORIDE 600; 310; 30; 20 MG/100ML; MG/100ML; MG/100ML; MG/100ML
125 INJECTION, SOLUTION INTRAVENOUS CONTINUOUS
Status: DISCONTINUED | OUTPATIENT
Start: 2020-03-31 | End: 2020-04-01 | Stop reason: HOSPADM

## 2020-03-31 RX ORDER — METHYLERGONOVINE MALEATE 0.2 MG/ML
200 INJECTION INTRAVENOUS ONCE AS NEEDED
Status: DISCONTINUED | OUTPATIENT
Start: 2020-03-31 | End: 2020-03-31 | Stop reason: HOSPADM

## 2020-03-31 RX ORDER — PHYTONADIONE 1 MG/.5ML
INJECTION, EMULSION INTRAMUSCULAR; INTRAVENOUS; SUBCUTANEOUS
Status: DISPENSED
Start: 2020-03-31 | End: 2020-04-01

## 2020-03-31 RX ADMIN — SODIUM CHLORIDE, POTASSIUM CHLORIDE, SODIUM LACTATE AND CALCIUM CHLORIDE 125 ML/HR: 600; 310; 30; 20 INJECTION, SOLUTION INTRAVENOUS at 06:09

## 2020-03-31 RX ADMIN — OXYTOCIN 999 ML/HR: 10 INJECTION, SOLUTION INTRAMUSCULAR; INTRAVENOUS at 14:59

## 2020-03-31 RX ADMIN — SERTRALINE 50 MG: 50 TABLET, FILM COATED ORAL at 21:13

## 2020-03-31 RX ADMIN — NICOTINE 1 PATCH: 14 PATCH, EXTENDED RELEASE TRANSDERMAL at 21:12

## 2020-03-31 RX ADMIN — Medication: at 18:26

## 2020-03-31 RX ADMIN — ONDANSETRON 4 MG: 2 INJECTION INTRAMUSCULAR; INTRAVENOUS at 13:54

## 2020-03-31 RX ADMIN — Medication 10 ML/HR: at 08:58

## 2020-03-31 RX ADMIN — LIDOCAINE HYDROCHLORIDE 5 ML: 10 INJECTION, SOLUTION EPIDURAL; INFILTRATION; INTRACAUDAL; PERINEURAL at 08:56

## 2020-03-31 RX ADMIN — SODIUM CHLORIDE, POTASSIUM CHLORIDE, SODIUM LACTATE AND CALCIUM CHLORIDE 1000 ML: 600; 310; 30; 20 INJECTION, SOLUTION INTRAVENOUS at 09:16

## 2020-03-31 RX ADMIN — OXYTOCIN 2 MILLI-UNITS/MIN: 10 INJECTION, SOLUTION INTRAMUSCULAR; INTRAVENOUS at 06:39

## 2020-03-31 RX ADMIN — OXYCODONE HYDROCHLORIDE AND ACETAMINOPHEN 1 TABLET: 5; 325 TABLET ORAL at 19:56

## 2020-03-31 RX ADMIN — OXYTOCIN 125 ML/HR: 10 INJECTION, SOLUTION INTRAMUSCULAR; INTRAVENOUS at 16:17

## 2020-03-31 RX ADMIN — SODIUM CHLORIDE, PRESERVATIVE FREE 3 ML: 5 INJECTION INTRAVENOUS at 21:10

## 2020-03-31 RX ADMIN — IBUPROFEN 600 MG: 600 TABLET, FILM COATED ORAL at 19:56

## 2020-03-31 RX ADMIN — LIDOCAINE HYDROCHLORIDE AND EPINEPHRINE 3 ML: 15; 5 INJECTION, SOLUTION EPIDURAL at 08:52

## 2020-03-31 NOTE — ANESTHESIA PROCEDURE NOTES
Labor Epidural      Patient reassessed immediately prior to procedure    Patient location during procedure: OB  Performed By  Anesthesiologist: Jovani Hollins MD  Preanesthetic Checklist  Completed: patient identified, site marked, surgical consent, pre-op evaluation, timeout performed, IV checked, risks and benefits discussed and monitors and equipment checked  Additional Notes  Test dose 3 cc 1.5% lidocaine with epi.  Second dose 5cc 1% lidocaine then continuous infusion o.2% Ropivicaine with 2 Mics fentanyl per cc at 10 cc hr, with  6cc PCA, 15 min lockout  Prep:  Pt Position:sitting  Sterile Tech:gloves, cap, mask and sterile barrier  Prep:chlorhexidine gluconate and isopropyl alcohol  Monitoring:blood pressure monitoring, continuous pulse oximetry and EKG  Epidural Block Procedure:  Approach:midline  Guidance:landmark technique  Location:L4-L5  Needle Type:Tuohy  Needle Gauge:18  Loss of Resistance Medium: air  Paresthesia: none  Aspiration:negative  Test Dose:negative  Number of Attempts: 1  Post Assessment:  Dressing:occlusive dressing applied and secured with tape  Pt Tolerance:patient tolerated the procedure well with no apparent complications  Complications:no

## 2020-03-31 NOTE — ANESTHESIA PREPROCEDURE EVALUATION
Anesthesia Evaluation     Patient summary reviewed and Nursing notes reviewed   no history of anesthetic complications:               Airway   Mallampati: II  Dental - normal exam     Pulmonary    (+) a smoker,   Cardiovascular - negative cardio ROS        Neuro/Psych- negative ROS  GI/Hepatic/Renal/Endo - negative ROS     Musculoskeletal (-) negative ROS    Abdominal    Substance History - negative use     OB/GYN negative ob/gyn ROS     Comment: 39 WKS      Other                      Anesthesia Plan    ASA 2     epidural       Anesthetic plan, all risks, benefits, and alternatives have been provided, discussed and informed consent has been obtained with: patient.

## 2020-04-01 VITALS
TEMPERATURE: 97.6 F | HEART RATE: 83 BPM | BODY MASS INDEX: 32.96 KG/M2 | HEIGHT: 63 IN | WEIGHT: 186 LBS | RESPIRATION RATE: 18 BRPM | DIASTOLIC BLOOD PRESSURE: 90 MMHG | SYSTOLIC BLOOD PRESSURE: 135 MMHG

## 2020-04-01 LAB
BASOPHILS # BLD AUTO: 0.06 10*3/MM3 (ref 0–0.2)
BASOPHILS NFR BLD AUTO: 0.5 % (ref 0–1.5)
DEPRECATED RDW RBC AUTO: 43.5 FL (ref 37–54)
EOSINOPHIL # BLD AUTO: 0.24 10*3/MM3 (ref 0–0.4)
EOSINOPHIL NFR BLD AUTO: 1.8 % (ref 0.3–6.2)
ERYTHROCYTE [DISTWIDTH] IN BLOOD BY AUTOMATED COUNT: 13.9 % (ref 12.3–15.4)
HCT VFR BLD AUTO: 35.1 % (ref 34–46.6)
HGB BLD-MCNC: 11.6 G/DL (ref 12–15.9)
IMM GRANULOCYTES # BLD AUTO: 0.06 10*3/MM3 (ref 0–0.05)
IMM GRANULOCYTES NFR BLD AUTO: 0.5 % (ref 0–0.5)
LYMPHOCYTES # BLD AUTO: 3.4 10*3/MM3 (ref 0.7–3.1)
LYMPHOCYTES NFR BLD AUTO: 26.2 % (ref 19.6–45.3)
MCH RBC QN AUTO: 28.4 PG (ref 26.6–33)
MCHC RBC AUTO-ENTMCNC: 33 G/DL (ref 31.5–35.7)
MCV RBC AUTO: 85.8 FL (ref 79–97)
MONOCYTES # BLD AUTO: 1.03 10*3/MM3 (ref 0.1–0.9)
MONOCYTES NFR BLD AUTO: 7.9 % (ref 5–12)
NEUTROPHILS # BLD AUTO: 8.2 10*3/MM3 (ref 1.7–7)
NEUTROPHILS NFR BLD AUTO: 63.1 % (ref 42.7–76)
NRBC BLD AUTO-RTO: 0 /100 WBC (ref 0–0.2)
PLATELET # BLD AUTO: 353 10*3/MM3 (ref 140–450)
PMV BLD AUTO: 10.3 FL (ref 6–12)
RBC # BLD AUTO: 4.09 10*6/MM3 (ref 3.77–5.28)
WBC NRBC COR # BLD: 12.99 10*3/MM3 (ref 3.4–10.8)

## 2020-04-01 PROCEDURE — 85025 COMPLETE CBC W/AUTO DIFF WBC: CPT | Performed by: OBSTETRICS & GYNECOLOGY

## 2020-04-01 PROCEDURE — 90791 PSYCH DIAGNOSTIC EVALUATION: CPT | Performed by: SOCIAL WORKER

## 2020-04-01 PROCEDURE — 99024 POSTOP FOLLOW-UP VISIT: CPT | Performed by: OBSTETRICS & GYNECOLOGY

## 2020-04-01 RX ORDER — IBUPROFEN 600 MG/1
600 TABLET ORAL EVERY 8 HOURS PRN
Qty: 30 TABLET | Refills: 0 | Status: SHIPPED | OUTPATIENT
Start: 2020-04-01 | End: 2021-08-09

## 2020-04-01 RX ORDER — OXYCODONE HYDROCHLORIDE AND ACETAMINOPHEN 5; 325 MG/1; MG/1
1 TABLET ORAL EVERY 6 HOURS PRN
Qty: 12 TABLET | Refills: 0 | Status: SHIPPED | OUTPATIENT
Start: 2020-04-01 | End: 2020-04-10

## 2020-04-01 RX ADMIN — IBUPROFEN 600 MG: 600 TABLET, FILM COATED ORAL at 08:16

## 2020-04-01 RX ADMIN — OXYCODONE HYDROCHLORIDE AND ACETAMINOPHEN 2 TABLET: 5; 325 TABLET ORAL at 08:16

## 2020-04-01 RX ADMIN — OXYCODONE HYDROCHLORIDE AND ACETAMINOPHEN 1 TABLET: 5; 325 TABLET ORAL at 02:15

## 2020-04-01 NOTE — DISCHARGE SUMMARY
PPD 2 Discharge Summary/Progress      This  female, was admitted on 3/31/2020 and underwent a Vaginal, Spontaneous  on 3/31/2020 , resulting in the birth of the following:  Infant    Findings:    Infant observations:                            APGARS  One minute Five minutes Ten minutes Fifteen minutes Twenty minutes   Skin color: 0   1             Heart rate: 2   2             Grimace: 2   2              Muscle tone: 2   2              Breathin   2              Totals: 8   9                  LABS:   Lab Results (last 72 hours)     Procedure Component Value Units Date/Time    CBC & Differential [[id number]] Collected:  20    Specimen:  Blood Updated:  20    Narrative:       The following orders were created for panel order CBC & Differential.  Procedure                               Abnormality         Status                     ---------                               -----------         ------                     CBC Auto Differential[[id number]]        Abnormal            Final result                 Please view results for these tests on the individual orders.    CBC Auto Differential [[id number]]  (Abnormal) Collected:  20    Specimen:  Blood Updated:  20     WBC 12.99 10*3/mm3      RBC 4.09 10*6/mm3      Hemoglobin 11.6 g/dL      Hematocrit 35.1 %      MCV 85.8 fL      MCH 28.4 pg      MCHC 33.0 g/dL      RDW 13.9 %      RDW-SD 43.5 fl      MPV 10.3 fL      Platelets 353 10*3/mm3      Neutrophil % 63.1 %      Lymphocyte % 26.2 %      Monocyte % 7.9 %      Eosinophil % 1.8 %      Basophil % 0.5 %      Immature Grans % 0.5 %      Neutrophils, Absolute 8.20 10*3/mm3      Lymphocytes, Absolute 3.40 10*3/mm3      Monocytes, Absolute 1.03 10*3/mm3      Eosinophils, Absolute 0.24 10*3/mm3      Basophils, Absolute 0.06 10*3/mm3      Immature Grans, Absolute 0.06 10*3/mm3      nRBC 0.0 /100 WBC     POC Protein, Urine, Qualitative, Dipstick [[id number]]   (Normal) Collected:  20 08    Specimen:  Urine Updated:  20 0810     Protein, POC Negative mg/dL      Lot Number [id number]     Expiration Date 2021    CBC (No Diff) [[id number]]  (Abnormal) Collected:  20 05    Specimen:  Blood Updated:  20 0608     WBC 12.83 10*3/mm3      RBC 4.15 10*6/mm3      Hemoglobin 12.0 g/dL      Hematocrit 35.0 %      MCV 84.3 fL      MCH 28.9 pg      MCHC 34.3 g/dL      RDW 13.9 %      RDW-SD 42.8 fl      MPV 10.9 fL      Platelets 382 10*3/mm3             ROS:  Pulm: neg for soa  GI: neg for heavy bleeding  Musculoskel: neg for leg pain          ASSESSMENT/DISCHARGE SUMMARY    Post delivery the patient did well. She was tolerating a regular diet, ambulating, voiding and passing flatus. Post delivery hemoglobin was   Lab Results   Component Value Date    HGB 11.6 (L) 2020   .      Discharge diagnosis:   Hospital Problem List     Tobacco use during pregnancy, antepartum    Depression    Psychological condition is improving with treatment.  Continue current treatment regimen.  Psychological condition  will be reassessed in 2 weeks.      Term pregnancy    Vaginal delivery                                           Term pregnancy [Z34.90]                                     Vaginal Delivery at 39w0d, uncomplicated recovery    On day of discharge, uterus was firm, extremities were non tender with no erythema or masses. Lochia was normal.    Pt was given prescriptions for Percocet 5/325 and Motrin 800 mg PRN for pain, zoloft 50 q d     Instructed to call the office to make an appointment in  6 weeks after your delivery.    Please call the office, or the OB/GYN on-call if after-hours, for any questions, concerns or any of the followin) Fever - a temperature greater than 100.4  2) Uncontrolled pain  3) Uncontrolled bleeding (soaking more than 1 pad in an hour)  4) Foul-smelling discharge from the vagina    Do not place anything in the vagina - this  includes tampons, douches or having sex - until after your 6 week postpartum visit .    Jared Mendoza MD    4/1/2020  10:27

## 2020-04-01 NOTE — CONSULTS
"[facility] evaluated pt due to scoring a 16 on the Mat[place] Scale. Pt delivered her second child yesterday. Pt states she has a 6 yr old and that she had postpartum depression after her first pregnancy. Pt states she has struggled with depressed mood and anxiety most of her life and has had counseling in the past . Pt states she has been on antidepressants in the past and Zoloft has worked the best. Pt states she has not been on Zoloft in a year but her obgyn restarted it yesterday. Pt denies current SI but states she had an attempt at age 9. Pt states she has never been hospitalized for psych or DALE issues. Pt denies any drug or alcohol use but states she smokes about a pack of cigarettes a day. Pt rates her current anxiety as a \"6\" and her depressed mood as a \"6 or 7.\" Pt states the effects of the coronavirus in the community has created some extra anxiety for her. Pt states she has good support from her SO (FOB) of a year, her grandmother and her father. Pt lives in an apartment with her SO and 6 yr old. 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 Pt states she and SO had returned to [employer] before they closed. Pt states she is in school and considering a social work degree. Pt states her appetite is good but sleep has always been an issue. Pt states she has taken Trazadone in the past which helped her sleep. She states she will talk to her PCP about restarting trazadone if needed.     Pt states she would return to [facility] in [place] if she feels she needs outpt counseling. Pt to be d/c today.  "

## 2021-04-16 ENCOUNTER — BULK ORDERING (OUTPATIENT)
Dept: CASE MANAGEMENT | Facility: OTHER | Age: 26
End: 2021-04-16

## 2021-04-16 DIAGNOSIS — Z23 IMMUNIZATION DUE: ICD-10-CM

## 2021-07-07 ENCOUNTER — PREP FOR SURGERY (OUTPATIENT)
Dept: OTHER | Facility: HOSPITAL | Age: 26
End: 2021-07-07

## 2021-07-07 DIAGNOSIS — Z30.09 UNWANTED FERTILITY: Primary | ICD-10-CM

## 2021-07-07 RX ORDER — ONDANSETRON 2 MG/ML
4 INJECTION INTRAMUSCULAR; INTRAVENOUS EVERY 6 HOURS PRN
Status: CANCELLED | OUTPATIENT
Start: 2021-07-07

## 2021-07-07 RX ORDER — SODIUM CHLORIDE, SODIUM LACTATE, POTASSIUM CHLORIDE, CALCIUM CHLORIDE 600; 310; 30; 20 MG/100ML; MG/100ML; MG/100ML; MG/100ML
125 INJECTION, SOLUTION INTRAVENOUS CONTINUOUS
Status: CANCELLED | OUTPATIENT
Start: 2021-07-07

## 2021-07-07 RX ORDER — LEVOFLOXACIN 5 MG/ML
500 INJECTION, SOLUTION INTRAVENOUS ONCE
Status: CANCELLED | OUTPATIENT
Start: 2021-07-07 | End: 2021-07-07

## 2021-07-07 RX ORDER — SODIUM CHLORIDE 0.9 % (FLUSH) 0.9 %
10 SYRINGE (ML) INJECTION AS NEEDED
Status: CANCELLED | OUTPATIENT
Start: 2021-07-07

## 2021-07-07 RX ORDER — ACETAMINOPHEN 325 MG/1
650 TABLET ORAL ONCE
Status: CANCELLED | OUTPATIENT
Start: 2021-07-07 | End: 2021-07-07

## 2021-07-07 RX ORDER — SCOLOPAMINE TRANSDERMAL SYSTEM 1 MG/1
1 PATCH, EXTENDED RELEASE TRANSDERMAL CONTINUOUS
Status: CANCELLED | OUTPATIENT
Start: 2021-07-07 | End: 2021-07-10

## 2021-07-07 RX ORDER — SODIUM CHLORIDE 0.9 % (FLUSH) 0.9 %
3 SYRINGE (ML) INJECTION EVERY 12 HOURS SCHEDULED
Status: CANCELLED | OUTPATIENT
Start: 2021-07-07

## 2021-08-06 ENCOUNTER — PRE-ADMISSION TESTING (OUTPATIENT)
Dept: PREADMISSION TESTING | Facility: HOSPITAL | Age: 26
End: 2021-08-06

## 2021-08-06 DIAGNOSIS — Z30.09 UNWANTED FERTILITY: ICD-10-CM

## 2021-08-09 ENCOUNTER — LAB (OUTPATIENT)
Dept: LAB | Facility: HOSPITAL | Age: 26
End: 2021-08-09

## 2021-08-09 ENCOUNTER — PRE-ADMISSION TESTING (OUTPATIENT)
Dept: PREADMISSION TESTING | Facility: HOSPITAL | Age: 26
End: 2021-08-09

## 2021-08-09 VITALS
DIASTOLIC BLOOD PRESSURE: 82 MMHG | HEART RATE: 75 BPM | BODY MASS INDEX: 29.8 KG/M2 | OXYGEN SATURATION: 100 % | SYSTOLIC BLOOD PRESSURE: 118 MMHG | WEIGHT: 168.21 LBS | HEIGHT: 63 IN | TEMPERATURE: 96.9 F

## 2021-08-09 LAB
BASOPHILS # BLD AUTO: 0.09 10*3/MM3 (ref 0–0.2)
BASOPHILS NFR BLD AUTO: 1.3 % (ref 0–1.5)
DEPRECATED RDW RBC AUTO: 47.4 FL (ref 37–54)
EOSINOPHIL # BLD AUTO: 0.26 10*3/MM3 (ref 0–0.4)
EOSINOPHIL NFR BLD AUTO: 3.8 % (ref 0.3–6.2)
ERYTHROCYTE [DISTWIDTH] IN BLOOD BY AUTOMATED COUNT: 17.1 % (ref 12.3–15.4)
HCG INTACT+B SERPL-ACNC: <0.5 MIU/ML
HCT VFR BLD AUTO: 37.3 % (ref 34–46.6)
HGB BLD-MCNC: 11 G/DL (ref 12–15.9)
IMM GRANULOCYTES # BLD AUTO: 0.02 10*3/MM3 (ref 0–0.05)
IMM GRANULOCYTES NFR BLD AUTO: 0.3 % (ref 0–0.5)
LYMPHOCYTES # BLD AUTO: 2.48 10*3/MM3 (ref 0.7–3.1)
LYMPHOCYTES NFR BLD AUTO: 36.2 % (ref 19.6–45.3)
MCH RBC QN AUTO: 22.8 PG (ref 26.6–33)
MCHC RBC AUTO-ENTMCNC: 29.5 G/DL (ref 31.5–35.7)
MCV RBC AUTO: 77.2 FL (ref 79–97)
MONOCYTES # BLD AUTO: 0.7 10*3/MM3 (ref 0.1–0.9)
MONOCYTES NFR BLD AUTO: 10.2 % (ref 5–12)
NEUTROPHILS NFR BLD AUTO: 3.3 10*3/MM3 (ref 1.7–7)
NEUTROPHILS NFR BLD AUTO: 48.2 % (ref 42.7–76)
NRBC BLD AUTO-RTO: 0 /100 WBC (ref 0–0.2)
PLATELET # BLD AUTO: 653 10*3/MM3 (ref 140–450)
PMV BLD AUTO: 8.7 FL (ref 6–12)
RBC # BLD AUTO: 4.83 10*6/MM3 (ref 3.77–5.28)
WBC # BLD AUTO: 6.85 10*3/MM3 (ref 3.4–10.8)

## 2021-08-09 PROCEDURE — 84702 CHORIONIC GONADOTROPIN TEST: CPT

## 2021-08-09 PROCEDURE — 36415 COLL VENOUS BLD VENIPUNCTURE: CPT

## 2021-08-09 PROCEDURE — 85025 COMPLETE CBC W/AUTO DIFF WBC: CPT

## 2021-08-09 RX ORDER — NORGESTREL-ETHINYL ESTRADIOL 0.3-0.03MG
1 TABLET ORAL DAILY
COMMUNITY
Start: 2021-08-04 | End: 2021-08-12 | Stop reason: HOSPADM

## 2021-08-09 NOTE — DISCHARGE INSTRUCTIONS
IMPORTANT INSTRUCTIONS - PRE-ADMISSION TESTING  1. DO NOT EAT OR CHEW anything after midnight the night before your procedure.    2. You may have CLEAR liquids up to _2_____ hours prior to ARRIVAL time.   3. Take the following medications the morning of your procedure with JUST A SIP OF WATER:  ___none______    4. DO NOT BRING your medications to the hospital with you, UNLESS something has changed since your PRE-Admission Testing appointment.  5. Hold all vitamins, supplements, and NSAIDS (Non- steroidal anti-inflammatory meds) for one week prior to surgery (you MAY take Tylenol or Acetaminophen).  6. If you are diabetic, check your blood sugar the morning of your procedure. If it is less than 70 or if you are feeling symptomatic, call the following number for further instructions: [phone]  7. Use your inhalers/nebulizers as usual, the morning of your procedure. BRING YOUR INHALERS with you.   8. Bring your CPAP or BIPAP to hospital, ONLY IF YOU WILL BE SPENDING THE NIGHT.   9. Make sure you have a ride home and have someone who will stay with you the day of your procedure after you go home.  10. If you have any questions, please call your Pre-Admission Testing Nurse, _Darya__ at [phone]___________.   11. Per anesthesia request, do not smoke for 24 hours before your procedure or as instructed by your surgeon.      ••••••Clear Liquid Diet        Find out when you need to start a clear liquid diet.   Think of “clear liquids” as anything you could read a newspaper through. This includes things like water, broth, sports drinks, or tea WITHOUT any kind of milk or cream.           Once you are told to start a clear liquid diet, only drink these things until 2 hours before arrival to the hospital or when the hospital says to stop. Total volume limitation: 8 oz.       Clear liquids you CAN drink:   ; Water   ; Clear broth: beef, chicken, vegetable, or bone broth with nothing in it   ; Gatorade   ; Lemonade  or Joés Miguel-aid   ; Soda   ; Tea, coffee (NO cream or honey)   ; Jell-O (without fruit)   ; Popsicles (without fruit or cream)   ; Italian ices   ; Juice without pulp: apple, white, grape   ; You may use salt, pepper, and sugar    Do NOT drink:   ; Milk or cream   ; Soy milk, almond milk, coconut milk, or other non-dairy drinks and   creamers   ; Milkshakes or smoothies   ; Tomato juice   ; Orange juice   ; Grapefruit juice   ; Cream soups or any other than broth         Clear Liquid Diet:  ? Do NOT eat any solid food.  ? Do NOT eat or suck on mints or candy.  ? Do NOT chew gum.  ? Do NOT drink thick liquids like milk or juice with pulp in it.  ? Do NOT add milk, cream, or anything like soy milk or almond milk to coffee or tea.     PREOPERATIVE (BEFORE SURGERY)              BATHING INSTRUCTIONS  Instructions:   • You will need to shower 1 time utilizing the soap provided; at the times indicated   below:     8/12/21- AM     • Wash your hair and face with normal shampoo and soap, rinse it well before using the surgical soap.     • In the shower, wet the skin completely with water from your neck to your feet. Apply the cleanser to your   body ONLY FROM THE NECK TO YOUR FEET.    • Do NOT USE THE CLEANSER ON YOUR FACE, HEAD, OR GENITAL (PRIVATE) AREAS.   Keep it out of your eyes, ears, and mouth because of the risk of injury to those areas.     • Scrub with a clean washcloth for each bath utilizing the soap provided from the top of your body to the   bottom starting at the neck area.     • Pay close attention to your armpits, groin area, and the site of surgery.     • Wash your body gently for 5 minutes. Stand outside the stream or turn off the water while scrubbing your   body. Do NOT wash with your regular soap after the surgical cleanser is used.     • RINSE THE CLEANSER OFF COMPLETELY with plenty of water. Rinse the area again thoroughly.     • Dry off with a clean towel. The surgical soap can cause dryness; however do  NOT APPLY LOTION,   CREAM, POWDER, and/or DEODORANT AFTER SHOWERING.    • Be sure to where clean clothes after showering.     • Ensure CLEAN BED LINENS AFTER FIRST wash with the surgical soap.     • NO PETS ALLOWED IN THE BED with you after utilizing the surgical soap.

## 2021-08-11 PROBLEM — R73.09 ELEVATED GLUCOSE TOLERANCE TEST: Status: RESOLVED | Noted: 2019-12-23 | Resolved: 2021-08-11

## 2021-08-11 PROBLEM — Z34.90 TERM PREGNANCY: Status: RESOLVED | Noted: 2020-03-31 | Resolved: 2021-08-11

## 2021-08-11 PROBLEM — O99.330 TOBACCO USE DURING PREGNANCY, ANTEPARTUM: Status: RESOLVED | Noted: 2019-08-07 | Resolved: 2021-08-11

## 2021-08-12 ENCOUNTER — ANESTHESIA (OUTPATIENT)
Dept: PERIOP | Facility: HOSPITAL | Age: 26
End: 2021-08-12

## 2021-08-12 ENCOUNTER — HOSPITAL ENCOUNTER (OUTPATIENT)
Facility: HOSPITAL | Age: 26
Setting detail: HOSPITAL OUTPATIENT SURGERY
Discharge: HOME | End: 2021-08-12
Attending: OBSTETRICS & GYNECOLOGY | Admitting: OBSTETRICS & GYNECOLOGY

## 2021-08-12 ENCOUNTER — ANESTHESIA EVENT (OUTPATIENT)
Dept: PERIOP | Facility: HOSPITAL | Age: 26
End: 2021-08-12

## 2021-08-12 VITALS
HEART RATE: 93 BPM | SYSTOLIC BLOOD PRESSURE: 115 MMHG | DIASTOLIC BLOOD PRESSURE: 66 MMHG | WEIGHT: 166.89 LBS | OXYGEN SATURATION: 98 % | HEIGHT: 63 IN | BODY MASS INDEX: 29.57 KG/M2 | RESPIRATION RATE: 18 BRPM | TEMPERATURE: 97.7 F

## 2021-08-12 DIAGNOSIS — Z30.09 UNWANTED FERTILITY: Primary | ICD-10-CM

## 2021-08-12 LAB
ABO GROUP BLD: NORMAL
ABO GROUP BLD: NORMAL
BLD GP AB SCN SERPL QL: NEGATIVE
RH BLD: POSITIVE
RH BLD: POSITIVE
T&S EXPIRATION DATE: NORMAL

## 2021-08-12 PROCEDURE — 86900 BLOOD TYPING SEROLOGIC ABO: CPT

## 2021-08-12 PROCEDURE — 88302 TISSUE EXAM BY PATHOLOGIST: CPT | Performed by: OBSTETRICS & GYNECOLOGY

## 2021-08-12 PROCEDURE — 25010000002 DEXAMETHASONE PER 1 MG: Performed by: NURSE ANESTHETIST, CERTIFIED REGISTERED

## 2021-08-12 PROCEDURE — 25010000002 PROPOFOL 10 MG/ML EMULSION: Performed by: NURSE ANESTHETIST, CERTIFIED REGISTERED

## 2021-08-12 PROCEDURE — 25010000002 FENTANYL CITRATE (PF) 50 MCG/ML SOLUTION: Performed by: NURSE ANESTHETIST, CERTIFIED REGISTERED

## 2021-08-12 PROCEDURE — 86850 RBC ANTIBODY SCREEN: CPT | Performed by: OBSTETRICS & GYNECOLOGY

## 2021-08-12 PROCEDURE — 86900 BLOOD TYPING SEROLOGIC ABO: CPT | Performed by: OBSTETRICS & GYNECOLOGY

## 2021-08-12 PROCEDURE — 25010000002 MIDAZOLAM PER 1MG: Performed by: ANESTHESIOLOGY

## 2021-08-12 PROCEDURE — 25010000002 LEVOFLOXACIN PER 250 MG: Performed by: OBSTETRICS & GYNECOLOGY

## 2021-08-12 PROCEDURE — 86901 BLOOD TYPING SEROLOGIC RH(D): CPT | Performed by: OBSTETRICS & GYNECOLOGY

## 2021-08-12 PROCEDURE — 25010000002 KETOROLAC TROMETHAMINE PER 15 MG: Performed by: NURSE ANESTHETIST, CERTIFIED REGISTERED

## 2021-08-12 PROCEDURE — 25010000002 ONDANSETRON PER 1 MG: Performed by: NURSE ANESTHETIST, CERTIFIED REGISTERED

## 2021-08-12 PROCEDURE — 86901 BLOOD TYPING SEROLOGIC RH(D): CPT

## 2021-08-12 RX ORDER — DEXAMETHASONE SODIUM PHOSPHATE 4 MG/ML
INJECTION, SOLUTION INTRA-ARTICULAR; INTRALESIONAL; INTRAMUSCULAR; INTRAVENOUS; SOFT TISSUE AS NEEDED
Status: DISCONTINUED | OUTPATIENT
Start: 2021-08-12 | End: 2021-08-12 | Stop reason: SURG

## 2021-08-12 RX ORDER — MAGNESIUM HYDROXIDE 1200 MG/15ML
LIQUID ORAL AS NEEDED
Status: DISCONTINUED | OUTPATIENT
Start: 2021-08-12 | End: 2021-08-12 | Stop reason: HOSPADM

## 2021-08-12 RX ORDER — SODIUM CHLORIDE, SODIUM LACTATE, POTASSIUM CHLORIDE, CALCIUM CHLORIDE 600; 310; 30; 20 MG/100ML; MG/100ML; MG/100ML; MG/100ML
9 INJECTION, SOLUTION INTRAVENOUS CONTINUOUS PRN
Status: DISCONTINUED | OUTPATIENT
Start: 2021-08-12 | End: 2021-08-12 | Stop reason: HOSPADM

## 2021-08-12 RX ORDER — ROCURONIUM BROMIDE 10 MG/ML
INJECTION, SOLUTION INTRAVENOUS AS NEEDED
Status: DISCONTINUED | OUTPATIENT
Start: 2021-08-12 | End: 2021-08-12 | Stop reason: SURG

## 2021-08-12 RX ORDER — IBUPROFEN 600 MG/1
600 TABLET ORAL EVERY 6 HOURS PRN
Status: DISCONTINUED | OUTPATIENT
Start: 2021-08-12 | End: 2021-08-12 | Stop reason: HOSPADM

## 2021-08-12 RX ORDER — SODIUM CHLORIDE 0.9 % (FLUSH) 0.9 %
10 SYRINGE (ML) INJECTION AS NEEDED
Status: DISCONTINUED | OUTPATIENT
Start: 2021-08-12 | End: 2021-08-12 | Stop reason: HOSPADM

## 2021-08-12 RX ORDER — SCOLOPAMINE TRANSDERMAL SYSTEM 1 MG/1
1 PATCH, EXTENDED RELEASE TRANSDERMAL CONTINUOUS
Status: DISCONTINUED | OUTPATIENT
Start: 2021-08-12 | End: 2021-08-12 | Stop reason: SDUPTHER

## 2021-08-12 RX ORDER — FENTANYL CITRATE 50 UG/ML
INJECTION, SOLUTION INTRAMUSCULAR; INTRAVENOUS AS NEEDED
Status: DISCONTINUED | OUTPATIENT
Start: 2021-08-12 | End: 2021-08-12 | Stop reason: SURG

## 2021-08-12 RX ORDER — ONDANSETRON 2 MG/ML
INJECTION INTRAMUSCULAR; INTRAVENOUS AS NEEDED
Status: DISCONTINUED | OUTPATIENT
Start: 2021-08-12 | End: 2021-08-12 | Stop reason: SURG

## 2021-08-12 RX ORDER — ONDANSETRON 2 MG/ML
4 INJECTION INTRAMUSCULAR; INTRAVENOUS EVERY 6 HOURS PRN
Status: DISCONTINUED | OUTPATIENT
Start: 2021-08-12 | End: 2021-08-12 | Stop reason: HOSPADM

## 2021-08-12 RX ORDER — OXYCODONE HYDROCHLORIDE 5 MG/1
5 TABLET ORAL
Status: DISCONTINUED | OUTPATIENT
Start: 2021-08-12 | End: 2021-08-12 | Stop reason: HOSPADM

## 2021-08-12 RX ORDER — HYDROCODONE BITARTRATE AND ACETAMINOPHEN 5; 325 MG/1; MG/1
1 TABLET ORAL EVERY 6 HOURS PRN
Qty: 16 TABLET | Refills: 0 | Status: SHIPPED | OUTPATIENT
Start: 2021-08-12 | End: 2021-11-30

## 2021-08-12 RX ORDER — SODIUM CHLORIDE, SODIUM LACTATE, POTASSIUM CHLORIDE, CALCIUM CHLORIDE 600; 310; 30; 20 MG/100ML; MG/100ML; MG/100ML; MG/100ML
125 INJECTION, SOLUTION INTRAVENOUS CONTINUOUS
Status: DISCONTINUED | OUTPATIENT
Start: 2021-08-12 | End: 2021-08-12 | Stop reason: HOSPADM

## 2021-08-12 RX ORDER — MEPERIDINE HYDROCHLORIDE 25 MG/ML
12.5 INJECTION INTRAMUSCULAR; INTRAVENOUS; SUBCUTANEOUS
Status: DISCONTINUED | OUTPATIENT
Start: 2021-08-12 | End: 2021-08-12 | Stop reason: HOSPADM

## 2021-08-12 RX ORDER — ACETAMINOPHEN 325 MG/1
650 TABLET ORAL ONCE
Status: DISCONTINUED | OUTPATIENT
Start: 2021-08-12 | End: 2021-08-12

## 2021-08-12 RX ORDER — BUPIVACAINE HYDROCHLORIDE AND EPINEPHRINE 5; 5 MG/ML; UG/ML
INJECTION, SOLUTION EPIDURAL; INTRACAUDAL; PERINEURAL AS NEEDED
Status: DISCONTINUED | OUTPATIENT
Start: 2021-08-12 | End: 2021-08-12 | Stop reason: HOSPADM

## 2021-08-12 RX ORDER — ONDANSETRON 2 MG/ML
4 INJECTION INTRAMUSCULAR; INTRAVENOUS ONCE AS NEEDED
Status: DISCONTINUED | OUTPATIENT
Start: 2021-08-12 | End: 2021-08-12 | Stop reason: HOSPADM

## 2021-08-12 RX ORDER — PROPOFOL 10 MG/ML
VIAL (ML) INTRAVENOUS AS NEEDED
Status: DISCONTINUED | OUTPATIENT
Start: 2021-08-12 | End: 2021-08-12 | Stop reason: SURG

## 2021-08-12 RX ORDER — MIDAZOLAM HYDROCHLORIDE 2 MG/2ML
2 INJECTION, SOLUTION INTRAMUSCULAR; INTRAVENOUS ONCE
Status: COMPLETED | OUTPATIENT
Start: 2021-08-12 | End: 2021-08-12

## 2021-08-12 RX ORDER — PROMETHAZINE HYDROCHLORIDE 25 MG/1
25 SUPPOSITORY RECTAL ONCE AS NEEDED
Status: DISCONTINUED | OUTPATIENT
Start: 2021-08-12 | End: 2021-08-12 | Stop reason: HOSPADM

## 2021-08-12 RX ORDER — KETOROLAC TROMETHAMINE 30 MG/ML
INJECTION, SOLUTION INTRAMUSCULAR; INTRAVENOUS AS NEEDED
Status: DISCONTINUED | OUTPATIENT
Start: 2021-08-12 | End: 2021-08-12 | Stop reason: SURG

## 2021-08-12 RX ORDER — GLYCOPYRROLATE 0.2 MG/ML
0.2 INJECTION INTRAMUSCULAR; INTRAVENOUS
Status: COMPLETED | OUTPATIENT
Start: 2021-08-12 | End: 2021-08-12

## 2021-08-12 RX ORDER — SCOLOPAMINE TRANSDERMAL SYSTEM 1 MG/1
1 PATCH, EXTENDED RELEASE TRANSDERMAL CONTINUOUS
Status: DISCONTINUED | OUTPATIENT
Start: 2021-08-12 | End: 2021-08-12 | Stop reason: HOSPADM

## 2021-08-12 RX ORDER — LIDOCAINE HYDROCHLORIDE 20 MG/ML
INJECTION, SOLUTION INFILTRATION; PERINEURAL AS NEEDED
Status: DISCONTINUED | OUTPATIENT
Start: 2021-08-12 | End: 2021-08-12 | Stop reason: SURG

## 2021-08-12 RX ORDER — IBUPROFEN 600 MG/1
600 TABLET ORAL EVERY 6 HOURS PRN
Qty: 60 TABLET | Refills: 1 | Status: SHIPPED | OUTPATIENT
Start: 2021-08-12 | End: 2021-11-30

## 2021-08-12 RX ORDER — SODIUM CHLORIDE 0.9 % (FLUSH) 0.9 %
3 SYRINGE (ML) INJECTION EVERY 12 HOURS SCHEDULED
Status: DISCONTINUED | OUTPATIENT
Start: 2021-08-12 | End: 2021-08-12 | Stop reason: HOSPADM

## 2021-08-12 RX ORDER — LEVOFLOXACIN 5 MG/ML
500 INJECTION, SOLUTION INTRAVENOUS ONCE
Status: COMPLETED | OUTPATIENT
Start: 2021-08-12 | End: 2021-08-12

## 2021-08-12 RX ORDER — PHENYLEPHRINE HCL IN 0.9% NACL 1 MG/10 ML
SYRINGE (ML) INTRAVENOUS AS NEEDED
Status: DISCONTINUED | OUTPATIENT
Start: 2021-08-12 | End: 2021-08-12 | Stop reason: SURG

## 2021-08-12 RX ORDER — ACETAMINOPHEN 500 MG
1000 TABLET ORAL ONCE
Status: COMPLETED | OUTPATIENT
Start: 2021-08-12 | End: 2021-08-12

## 2021-08-12 RX ORDER — PROMETHAZINE HYDROCHLORIDE 12.5 MG/1
25 TABLET ORAL ONCE AS NEEDED
Status: DISCONTINUED | OUTPATIENT
Start: 2021-08-12 | End: 2021-08-12 | Stop reason: HOSPADM

## 2021-08-12 RX ADMIN — LEVOFLOXACIN 500 MG: 5 INJECTION, SOLUTION INTRAVENOUS at 09:30

## 2021-08-12 RX ADMIN — LIDOCAINE HYDROCHLORIDE 80 MG: 20 INJECTION, SOLUTION INFILTRATION; PERINEURAL at 09:28

## 2021-08-12 RX ADMIN — MIDAZOLAM HYDROCHLORIDE 2 MG: 1 INJECTION, SOLUTION INTRAMUSCULAR; INTRAVENOUS at 09:05

## 2021-08-12 RX ADMIN — LIDOCAINE HYDROCHLORIDE 80 MG: 20 INJECTION, SOLUTION INFILTRATION; PERINEURAL at 10:07

## 2021-08-12 RX ADMIN — Medication 100 MCG: at 09:54

## 2021-08-12 RX ADMIN — OXYCODONE HYDROCHLORIDE 5 MG: 5 TABLET ORAL at 10:39

## 2021-08-12 RX ADMIN — FENTANYL CITRATE 25 MCG: 50 INJECTION INTRAMUSCULAR; INTRAVENOUS at 09:52

## 2021-08-12 RX ADMIN — ACETAMINOPHEN 1000 MG: 500 TABLET ORAL at 08:43

## 2021-08-12 RX ADMIN — Medication 100 MCG: at 09:49

## 2021-08-12 RX ADMIN — DEXAMETHASONE SODIUM PHOSPHATE 4 MG: 4 INJECTION INTRA-ARTICULAR; INTRALESIONAL; INTRAMUSCULAR; INTRAVENOUS; SOFT TISSUE at 09:42

## 2021-08-12 RX ADMIN — KETOROLAC TROMETHAMINE 30 MG: 30 INJECTION, SOLUTION INTRAMUSCULAR; INTRAVENOUS at 10:02

## 2021-08-12 RX ADMIN — SUGAMMADEX 200 MG: 100 INJECTION, SOLUTION INTRAVENOUS at 10:07

## 2021-08-12 RX ADMIN — SODIUM CHLORIDE, POTASSIUM CHLORIDE, SODIUM LACTATE AND CALCIUM CHLORIDE 9 ML/HR: 600; 310; 30; 20 INJECTION, SOLUTION INTRAVENOUS at 08:33

## 2021-08-12 RX ADMIN — METRONIDAZOLE 500 MG: 500 INJECTION, SOLUTION INTRAVENOUS at 09:37

## 2021-08-12 RX ADMIN — GLYCOPYRROLATE 0.2 MG: 0.2 INJECTION INTRAMUSCULAR; INTRAVENOUS at 09:05

## 2021-08-12 RX ADMIN — FENTANYL CITRATE 25 MCG: 50 INJECTION INTRAMUSCULAR; INTRAVENOUS at 09:53

## 2021-08-12 RX ADMIN — ROCURONIUM BROMIDE 40 MG: 10 INJECTION INTRAVENOUS at 09:29

## 2021-08-12 RX ADMIN — ONDANSETRON 4 MG: 2 INJECTION INTRAMUSCULAR; INTRAVENOUS at 10:01

## 2021-08-12 RX ADMIN — FENTANYL CITRATE 50 MCG: 50 INJECTION INTRAMUSCULAR; INTRAVENOUS at 09:28

## 2021-08-12 RX ADMIN — PROPOFOL 150 MG: 10 INJECTION, EMULSION INTRAVENOUS at 09:28

## 2021-08-12 RX ADMIN — SCOPALAMINE 1 PATCH: 1 PATCH, EXTENDED RELEASE TRANSDERMAL at 08:49

## 2021-08-12 NOTE — ANESTHESIA PREPROCEDURE EVALUATION
Anesthesia Evaluation     Patient summary reviewed and Nursing notes reviewed   no history of anesthetic complications:  NPO Solid Status: > 8 hours  NPO Liquid Status: > 2 hours           Airway   Mallampati: II  TM distance: >3 FB  Neck ROM: full  No difficulty expected  Dental      Pulmonary - negative pulmonary ROS and normal exam    breath sounds clear to auscultation  Cardiovascular - negative cardio ROS and normal exam  Exercise tolerance: good (4-7 METS)    Rhythm: regular        Neuro/Psych- negative ROS  GI/Hepatic/Renal/Endo - negative ROS     Musculoskeletal (-) negative ROS    Abdominal    Substance History - negative use     OB/GYN negative ob/gyn ROS         Other - negative ROS                       Anesthesia Plan    ASA 2     general       Anesthetic plan, all risks, benefits, and alternatives have been provided, discussed and informed consent has been obtained with: patient.

## 2021-08-12 NOTE — ANESTHESIA POSTPROCEDURE EVALUATION
Patient: Paris Montoya    Procedure Summary     Date: 08/12/21 Room / Location: [facility] OR 05 / [facility] MAIN OR    Anesthesia Start: 0925 Anesthesia Stop: 1020    Procedure: SALPINGECTOMY LAPAROSCOPIC (Bilateral Abdomen) Diagnosis:       Unwanted fertility      (Unwanted fertility [Z30.09])    Surgeons: Booker Adamson MD Provider: Blayne Xie MD    Anesthesia Type: general ASA Status: 2          Anesthesia Type: general    Vitals  Vitals Value Taken Time   /77 08/12/21 1050   Temp 36.2 °C (97.1 °F) 08/12/21 1019   Pulse 84 08/12/21 1051   Resp 18 08/12/21 1040   SpO2 99 % 08/12/21 1051   Vitals shown include unvalidated device data.        Post Anesthesia Care and Evaluation    Patient location during evaluation: bedside  Patient participation: complete - patient participated  Level of consciousness: awake  Pain management: adequate  Airway patency: patent  Anesthetic complications: No anesthetic complications  PONV Status: none  Cardiovascular status: acceptable and stable  Respiratory status: acceptable and room air  Hydration status: acceptable    Comments: An Anesthesiologist personally participated in the most demanding procedures (including induction and emergence if applicable) in the anesthesia plan, monitored the course of anesthesia administration at frequent intervals and remained physically present and available for immediate diagnosis and treatment of emergencies.

## 2021-08-12 NOTE — DISCHARGE INSTRUCTIONS
DISCHARGE INSTRUCTIONS  GYNECOLOGICAL  PROCEDURES      ? For your surgery you had:  ? General anesthesia (you may have a sore throat for the first 24 hours)  ? IV sedation  ? Local anesthesia  ? Monitored anesthesia care  ? You received a medicated patch for nausea prevention today (behind your ear). It is recommended that you remove it 24-48 hours post-operatively. It must be removed within 72 hours.  ? You received an anesthesia medication today that can cause hormonal forms of birth control to be ineffective. You should use a different form of birth control (to prevent pregnancy) for 7 days.   ? You may experience dizziness, drowsiness, or lightheadedness for several hours following surgery.  ? Do not stay alone today or tonight.  ? Limit your activity for 24 hours.  ? Resume your diet slowly.  Follow any special dietary instructions you may have been given by your doctor.  ? You should not drive or operate machinery, drink alcohol, or sign legally binding documents for 24 hours or while you are taking pain medication.  Last dose of pain medication was given at:  .  NOTIFY YOUR DOCTOR IF YOU EXPERIENCE ANY OF THE FOLLOWING:  ? Temperature greater than 101 degrees Fahrenheit  ? Shaking Chills  ? Redness or excessive drainage from incision  ? Nausea, vomiting and/or pain that is not controlled by prescribed medications  ? Increase in bleeding or bleeding that is excessive  ? Unable to urinate in 6 hours after surgery  ? If unable to reach your doctor, please go to the closest Emergency Room [] Remove dressing in:     [] You may resume intercourse and the use of tampons as your physician has instructed you.  [] Nothing in the vagina for 2 weeks to include intercourse, douches, or tampons.  [] Vaginal bleeding may be expected for several days with flow decreasing with time and never any heavier than a normal   period.  ? If you have an ablation, vaginal discharge is expected after bleeding stops.   ? If you have  foul smelling discharge, notify your physician.  ? Medications per physician instructions as indicated on Discharge Medication Information Sheet.  You should see   for follow-up care   on   .  Phone number:      SPECIAL INSTRUCTIONS:                           No driving while taking narcotic pain medications  No lifting more than 15 to 20 pounds for 1 to 2 weeks  No intercourse until follow-up  Keep the incisions clean and dry  Remove outer bandage 24 hours after surgery  Remove inner bandage/Steri-Strips 1 week from surgery  Call for temperature greater than 100 °F, shortness of breath or chest pain, heavy vaginal bleeding soaking a pad in less than 1 hour, redness swelling or drainage from the incisions, excessive nausea or vomiting, or pain that is worsening despite current pain medications

## 2021-08-12 NOTE — OP NOTE
SALPINGECTOMY LAPAROSCOPIC  Procedure Report    Patient Name:  Paris Montoya  YOB: 1995    Date of Surgery:  8/12/2021    Pre-op Diagnosis:   Unwanted fertility [Z30.09]       Post-Op Diagnosis Codes:     * Unwanted fertility [Z30.09]    Procedure/CPT® Codes:  PA LAP,RMV  ADNEXAL STRUCTURE [42803]    Procedure(s):  SALPINGECTOMY LAPAROSCOPIC    Staff:  Surgeon(s):  Booker Adamson MD    Assistant: Leelee Huff RN    Anesthesia: Choice    Estimated Blood Loss: 10 mL      Specimen:          Specimens     ID Source Type Tests Collected By Collected At Frozen?    A Fallopian Tubes, Bilateral Tissue · TISSUE PATHOLOGY EXAM   Booker Adamson MD 8/12/21 0959               Findings: Normal uterus, bilateral fallopian tubes and bilateral ovaries.  There were no pelvic adhesions.  There was no evidence of endometriosis.    Complications: None    Description of Procedure: After reviewing the informed consent including the risks benefits and alternatives to the procedure, the patient expressed her understanding and desire to proceed.  She was taken to the operating room with an IV in place and running.  She was placed on the operating table in the dorsal supine position.  The patient was given a general anesthetic, and intubated endotracheally.  The patient was then repositioned into the dorsal lithotomy position.  Her arms were tucked to the side, and her legs were placed in yellow-fin stirrups.  We took care in positioning both the arms and legs, padding any potential pressure points.  The bladder was drained in a sterile fashion with an in and out catheterization. After a surgical time-out was performed, the patient was prepped and draped in the usual sterile fashion.  I placed an open sided speculum in the vagina.  The cervix was grasped with a single-tooth tenaculum.  I placed a Hulka tenaculum without difficulty.  The single-tooth tenaculum and speculum were removed.  I then changed my  gloves, and proceeded to the abdomen.    After ensuring the patient was flat, and had an orogastric tube in place, I infiltrated in the planned incision sites with a total of 10mL of half percent Marcaine with epinephrine.  I made a horizontal infraumbilical skin incision with the scalpel.  With anterior traction on the anterior abdominal wall I inserted the Veress needle into the peritoneal cavity without difficulty.  Free-flowing saline through the Veress needle confirmed my intraperitoneal location.  Opening pressures were less than 7 mm of mercury.  The abdomen was insufflated to 25 mm of mercury. I then used a 5 mm Optiview trocar to enter the peritoneal cavity under direct visualization.      I then surveyed the area below the trocar insertion site, and there was no evidence of any bowel or vascular injury.  At this time I placed an 5 mm trocar in the left lower quadrant and right lower quadrant, under direct visualization.  The intra-abdominal pressure were reduced to 15 mm of mercury.  The patient was placed in Trendelenburg position, and the bowel was evacuated out of the pelvis with a blunt probe.    The uterus was normal size, the fallopian tubes and ovaries were normal bilaterally.  There was no pelvic adhesions and there was no evidence of endometriosis.  The posterior cul-de-sac was normal.  I tented up the right fallopian tube, then using the LigaSure sealed and divided the distal end of the mesosalpinx just between the ovary and fallopian tube.  I carried this plane of dissection proximally just underneath the fallopian tube to the cornual region of the uterus.  I then amputated the fallopian tube, and extracted through the umbilical trocar.  I carried out the same procedure on the left side, completing the salpingectomy.  The operative pressures were lowered to 5 mm mercury. I surveyed my operative tissue pedicles, and there was excellent hemostasis.      This completed the my procedure.  The right  lower and left lower quadrant trochars were removed.  The seat to the umbilical trocar was removed, allowing complete evacuation of the pneumoperitoneum in patient's abdomen. The trocar cannula was then removed.  The skin sites were then reapproximated with simple, interrupted, subcuticular 4-0 Monocryl sutures.  The incisions were then reinforced with Mastisol and Steri-Strips, and covered with a coverlet.  The speculum was replaced in the vagina.  The Hulka tenaculum was removed.  The tenaculum sites were hemostatic.  The patient was taken out of lithotomy position, awoken from her general anesthesia, and taken to the recovery room in satisfactory condition.  All counts were correct x2, and the patient received Kefzol as her preoperative antibiotics.  The patient will be discharged home which she meets PACU criteria.  The patient will follow up with me in approximately 2 weeks, and this appointment will be made for her prior to discharge.  For discharge medications please see the medication reconciliation page.  The patient was instructed to call the office for any of the following: Temperature greater than 100°F, shortness of breath or chest pain, pain that is worsening despite current pain medications, excessive nausea or vomiting, redness swelling or drainage from the incisions, heavy vaginal bleeding, or other concerns.    Assistant: Leelee Huff RN  was responsible for performing the following activities: Retraction, Placing Dressing and Held/Positioned Camera and their skilled assistance was necessary for the success of this case.    Booker Adamson MD     Date: 8/12/2021  Time: 10:15 EDT

## 2021-08-12 NOTE — H&P
[facility]   HISTORY AND PHYSICAL    Patient Name: Paris Montoya  : 1995  MRN: [id number]  Primary Care Physician:  Provider, No Known  Date of admission:  2021    Subjective   Subjective     Chief Complaint: Here for surgery    HPI:    Paris Montoya is a 26 y.o. female who underwent on May 30, 2021.  During her pregnancy and postpartum period the pain expressed desire for permanent sterilization.  She states she has no desire for any future childbearing.  We have discussed her birth control options including natural family planning methods, condoms, estrogen only pills, combined oral contraceptives, vaginal birth control ring, birth control patches, Depo-Provera, Nexplanon, intrauterine contraceptives, and permanent female sterilization.  After reviewing the risks, benefits, alternatives all the options the patient has elected to proceed with permanent sterilization.    Review of Systems   The following systems were reviewed and negative;  breast, hematologic / lymphatic, endocrine and allergies / immunologic.    Personal History     Past Medical History:   Diagnosis Date   • Depression        No past surgical history on file.    Family History: family history includes Heart attack in her maternal grandfather; Hypertension in her father; Lung cancer in her maternal grandmother and paternal grandfather. Otherwise pertinent FHx was reviewed and not pertinent to current issue.    Social History:  reports that she has been smoking cigarettes. She has a 17.00 pack-year smoking history. She has never used smokeless tobacco. She reports that she does not drink alcohol and does not use drugs.    Home Medications:  norgestrel-ethinyl estradiol      Allergies:  Allergies   Allergen Reactions   • Azithromycin Hives   • Penicillins Rash       Objective   Objective     Vitals:      Physical Exam    Constitutional: Awake, alert   Eyes: PERRLA, sclerae anicteric, no conjunctival injection   HENT: NCAT,  mucous membranes moist   Neck: Supple, no thyromegaly, no lymphadenopathy, trachea midline   Respiratory: Clear to auscultation bilaterally, nonlabored respirations    Cardiovascular: RRR, no murmurs, rubs, or gallops, palpable pedal pulses bilaterally   Gastrointestinal: Positive bowel sounds, soft, nontender, nondistended              Genitourinary: Normal external genitalia, vagina, and cervix.  Uterus is approximately 9 cm in size, mobile and nontender.  There are no palpable adnexal masses   Musculoskeletal: No bilateral ankle edema, no clubbing or cyanosis to extremities   Psychiatric: Appropriate affect, cooperative   Neurologic: Oriented x 3, strength symmetric in all extremities, speech clear   Skin: No rashes     Result Review    Result Review:  I have personally reviewed the results from the time of this admission to 8/11/2021 21:32 EDT and agree with these findings:  [x]  Laboratory  []  Microbiology  [x]  Radiology  []  EKG/Telemetry   []  Cardiology/Vascular   [x]  Pathology  [x]  Old records  []  Other:      Assessment/Plan   Assessment / Plan        Active Hospital Problems:  Active Hospital Problems    Diagnosis    • **Unwanted fertility      Added automatically from request for surgery [id number]       Plan:   As per the HPI, the patient has expressed desire for permanent sterilization.  We have discussed the risks, benefits, and alternatives to the procedure.  We discussed the risks including the risk of infection, bleeding and hemorrhage, injury to nearby structure including, bowel, bladder, pelvic vasculature, pelvic nerves, ovaries, and the uterus, and other nearby structures.  We have discussed the risks of regret, risk of failure, and if failure occurred and increased risk for ectopic pregnancy.  We discussed the risk of menstrual changes, hormonal changes, and risk of pelvic pain post sterilization.  The patient has been offered alternative forms of birth control including: Oral contraceptives,  NuvaRing, birth control patches, progesterone only birth control pills, Depo-Provera, all intrauterine contraceptives, partner vasectomy.  The patient expresses her understanding and wished to proceed with female permanent sterilization.  We have discussed the different methods of female sterilization including hysteroscopic and laparoscopic techniques.  With the laparoscopic techniques we have discussed occlusion of the tube with Filshie clips, Hulka clips, and Falope-Rings.  We have discussed cauterization of the fallopian tube, partial salpingectomy, and complete salpingectomy.  After reviewing all the different options and the risks and benefits the patient has elected to proceed with laparoscopic bilateral salpingectomy.    Electronically signed by Booker Adamson MD, 08/11/21, 9:32 PM EDT.

## 2021-08-13 LAB
CYTO UR: NORMAL
LAB AP CASE REPORT: NORMAL
LAB AP CLINICAL INFORMATION: NORMAL
PATH REPORT.FINAL DX SPEC: NORMAL
PATH REPORT.GROSS SPEC: NORMAL

## 2021-09-08 ENCOUNTER — OFFICE VISIT (OUTPATIENT)
Dept: OBSTETRICS AND GYNECOLOGY | Facility: CLINIC | Age: 26
End: 2021-09-08

## 2021-09-08 VITALS
SYSTOLIC BLOOD PRESSURE: 110 MMHG | DIASTOLIC BLOOD PRESSURE: 70 MMHG | HEIGHT: 63 IN | BODY MASS INDEX: 29.06 KG/M2 | WEIGHT: 164 LBS

## 2021-09-08 DIAGNOSIS — Z09 FOLLOW-UP EXAMINATION AFTER ABDOMINAL SURGERY: Primary | ICD-10-CM

## 2021-09-08 PROCEDURE — 99212 OFFICE O/P EST SF 10 MIN: CPT | Performed by: OBSTETRICS & GYNECOLOGY

## 2021-09-08 NOTE — PROGRESS NOTES
"      CC: Post op    Subjective: No problems. No pain, no fever, no vaginal bleeding, no vaginal discharge. No nausea and vomiting. Normal voids and BM.  Operative report, surgical findings and any pathology reviewed.    PHYSICAL EXAM:  /70   Ht 160 cm (63\")   Wt 74.4 kg (164 lb)   BMI 29.05 kg/m²   General- NAD, alert and oriented, appropriate  Psych- Normal mood, good memory    Abdomen- Soft, non distended, non tender, no masses  Incision/s-  Clean, dry, intact, healing well   Ext- No edema, no cyanosis   Skin- No lesions, no rashes, no acanthosis nigricans      ASSESSMENT:   Post op Laparoscopic bilateral salpingectomy    PLAN:    Any available photos and/or pathology were reviewed.  All questions answered.     Ok to resume normal activities, Ok to resume intercourse      Follow Up:  PRN or WWE          Booker Adamson MD  09/08/2021      "

## 2021-09-09 PROBLEM — Z30.09 UNWANTED FERTILITY: Status: RESOLVED | Noted: 2021-07-07 | Resolved: 2021-09-09

## (undated) DEVICE — LITHOTOMY-YELLOW FINS: Brand: MEDLINE INDUSTRIES, INC.

## (undated) DEVICE — APPL CHLORAPREP HI/LITE 26ML ORNG

## (undated) DEVICE — SYR LL TP 10ML STRL

## (undated) DEVICE — GLV SURG SENSICARE ORTHO PF LF 7 STRL

## (undated) DEVICE — PAD GRND REM POLYHESIVE A/ DISP

## (undated) DEVICE — GLV SURG BIOGEL LTX PF 7

## (undated) DEVICE — MARYLAND JAW LAPAROSCOPIC SEALER/DIVIDER COATED: Brand: LIGASURE

## (undated) DEVICE — SUT MNCRYL PLS ANTIB UD 4/0 PS2 18IN

## (undated) DEVICE — DUAL LUMEN STOMACH TUBE,ANTI-REFLUX VALVE: Brand: SALEM SUMP

## (undated) DEVICE — 3M™ STERI-STRIP™ REINFORCED ADHESIVE SKIN CLOSURES, R1547, 1/2 IN X 4 IN (12 MM X 100 MM), 6 STRIPS/ENVELOPE: Brand: 3M™ STERI-STRIP™

## (undated) DEVICE — PCH SURG INST LAP 2 LF

## (undated) DEVICE — GLV SURG BIOGEL LTX PF 7 1/2

## (undated) DEVICE — ENDOPATH PNEUMONEEDLE INSUFFLATION NEEDLES WITH LUER LOCK CONNECTORS 120MM: Brand: ENDOPATH

## (undated) DEVICE — NDL HYPO ECLPS SFTY 22G 1 1/2IN

## (undated) DEVICE — CATH URETH ALLPURP STR RUBBR 16F RD

## (undated) DEVICE — TBG INSUFFLATION W/CPC CONNEC: Brand: MEDLINE INDUSTRIES, INC.

## (undated) DEVICE — INTENDED FOR TISSUE SEPARATION, AND OTHER PROCEDURES THAT REQUIRE A SHARP SURGICAL BLADE TO PUNCTURE OR CUT.: Brand: BARD-PARKER ® CARBON RIB-BACK BLADES

## (undated) DEVICE — ENDOPATH XCEL BLADELESS TROCARS WITH STABILITY SLEEVES: Brand: ENDOPATH XCEL

## (undated) DEVICE — LAPAROSCOPIC TROCAR SLEEVE/SINGLE USE: Brand: KII® OPTICAL ACCESS SYSTEM

## (undated) DEVICE — ENDOPATH XCEL WITH OPTIVIEW TECHNOLOGY UNIVERSAL TROCAR STABILITY SLEEVES: Brand: ENDOPATH XCEL OPTIVIEW

## (undated) DEVICE — TRY PREP SCRB VAG PVP

## (undated) DEVICE — SLV SCD LEG COMFORT KENDALLSCD MD REPROC

## (undated) DEVICE — ADHS LIQ MASTISOL 2/3ML